# Patient Record
Sex: MALE | Race: WHITE | Employment: OTHER | URBAN - METROPOLITAN AREA
[De-identification: names, ages, dates, MRNs, and addresses within clinical notes are randomized per-mention and may not be internally consistent; named-entity substitution may affect disease eponyms.]

---

## 2017-02-21 PROBLEM — Z79.4 UNCONTROLLED TYPE 2 DIABETES MELLITUS WITH HYPERGLYCEMIA, WITH LONG-TERM CURRENT USE OF INSULIN (HCC): Status: ACTIVE | Noted: 2017-02-21

## 2017-02-21 PROBLEM — E11.65 UNCONTROLLED TYPE 2 DIABETES MELLITUS WITH HYPERGLYCEMIA, WITH LONG-TERM CURRENT USE OF INSULIN (HCC): Status: ACTIVE | Noted: 2017-02-21

## 2017-03-11 PROCEDURE — 82570 ASSAY OF URINE CREATININE: CPT | Performed by: INTERNAL MEDICINE

## 2017-03-11 PROCEDURE — 36415 COLL VENOUS BLD VENIPUNCTURE: CPT | Performed by: INTERNAL MEDICINE

## 2017-03-11 PROCEDURE — 82043 UR ALBUMIN QUANTITATIVE: CPT | Performed by: INTERNAL MEDICINE

## 2018-01-09 PROCEDURE — 83970 ASSAY OF PARATHORMONE: CPT | Performed by: INTERNAL MEDICINE

## 2018-01-09 PROCEDURE — 82570 ASSAY OF URINE CREATININE: CPT | Performed by: INTERNAL MEDICINE

## 2018-01-09 PROCEDURE — 84156 ASSAY OF PROTEIN URINE: CPT | Performed by: INTERNAL MEDICINE

## 2018-03-30 PROBLEM — E78.1 HYPERTRIGLYCERIDEMIA: Status: ACTIVE | Noted: 2018-03-30

## 2018-06-07 PROCEDURE — 87086 URINE CULTURE/COLONY COUNT: CPT | Performed by: INTERNAL MEDICINE

## 2018-06-08 PROCEDURE — 84153 ASSAY OF PSA TOTAL: CPT | Performed by: INTERNAL MEDICINE

## 2018-06-08 PROCEDURE — 83970 ASSAY OF PARATHORMONE: CPT | Performed by: INTERNAL MEDICINE

## 2018-06-13 PROBLEM — R31.0 GROSS HEMATURIA: Status: ACTIVE | Noted: 2018-06-13

## 2018-06-22 PROBLEM — N20.0 NEPHROLITHIASIS: Status: ACTIVE | Noted: 2018-06-22

## 2018-07-05 PROCEDURE — 82365 CALCULUS SPECTROSCOPY: CPT | Performed by: UROLOGY

## 2018-07-06 PROBLEM — R10.9 FLANK PAIN: Status: ACTIVE | Noted: 2018-07-06

## 2018-07-19 PROCEDURE — 88307 TISSUE EXAM BY PATHOLOGIST: CPT | Performed by: UROLOGY

## 2018-07-19 PROCEDURE — 87086 URINE CULTURE/COLONY COUNT: CPT | Performed by: UROLOGY

## 2018-08-22 PROBLEM — Z85.51 HISTORY OF BLADDER CANCER: Status: ACTIVE | Noted: 2018-08-22

## 2018-09-07 PROCEDURE — 82507 ASSAY OF CITRATE: CPT | Performed by: UROLOGY

## 2018-09-07 PROCEDURE — 83945 ASSAY OF OXALATE: CPT | Performed by: UROLOGY

## 2018-09-07 PROCEDURE — 82436 ASSAY OF URINE CHLORIDE: CPT | Performed by: UROLOGY

## 2018-09-07 PROCEDURE — 84392 ASSAY OF URINE SULFATE: CPT | Performed by: UROLOGY

## 2018-09-07 PROCEDURE — 82340 ASSAY OF CALCIUM IN URINE: CPT | Performed by: UROLOGY

## 2018-12-08 PROCEDURE — 82436 ASSAY OF URINE CHLORIDE: CPT | Performed by: UROLOGY

## 2018-12-08 PROCEDURE — 82340 ASSAY OF CALCIUM IN URINE: CPT | Performed by: UROLOGY

## 2018-12-08 PROCEDURE — 83945 ASSAY OF OXALATE: CPT | Performed by: UROLOGY

## 2018-12-08 PROCEDURE — 84392 ASSAY OF URINE SULFATE: CPT | Performed by: UROLOGY

## 2018-12-08 PROCEDURE — 86708 HEPATITIS A ANTIBODY: CPT | Performed by: INTERNAL MEDICINE

## 2018-12-08 PROCEDURE — 82610 CYSTATIN C: CPT | Performed by: INTERNAL MEDICINE

## 2018-12-08 PROCEDURE — 82507 ASSAY OF CITRATE: CPT | Performed by: UROLOGY

## 2019-01-09 ENCOUNTER — APPOINTMENT (OUTPATIENT)
Dept: CT IMAGING | Facility: HOSPITAL | Age: 64
End: 2019-01-09
Attending: EMERGENCY MEDICINE
Payer: COMMERCIAL

## 2019-01-09 ENCOUNTER — APPOINTMENT (OUTPATIENT)
Dept: GENERAL RADIOLOGY | Facility: HOSPITAL | Age: 64
End: 2019-01-09
Attending: EMERGENCY MEDICINE
Payer: COMMERCIAL

## 2019-01-09 ENCOUNTER — HOSPITAL ENCOUNTER (OUTPATIENT)
Facility: HOSPITAL | Age: 64
Setting detail: OBSERVATION
Discharge: HOME | End: 2019-01-10
Attending: EMERGENCY MEDICINE | Admitting: INTERNAL MEDICINE
Payer: COMMERCIAL

## 2019-01-09 DIAGNOSIS — R73.9 HYPERGLYCEMIA: ICD-10-CM

## 2019-01-09 DIAGNOSIS — N28.9 ACUTE RENAL INSUFFICIENCY: Primary | ICD-10-CM

## 2019-01-09 LAB
ALBUMIN SERPL-MCNC: 3.5 G/DL (ref 3.1–4.5)
ALBUMIN/GLOB SERPL: 0.9 {RATIO} (ref 1–2)
ALP LIVER SERPL-CCNC: 68 U/L (ref 45–117)
ALT SERPL-CCNC: 51 U/L (ref 17–63)
ANION GAP SERPL CALC-SCNC: 9 MMOL/L (ref 0–18)
AST SERPL-CCNC: 46 U/L (ref 15–41)
ATRIAL RATE: 89 BPM
BASOPHILS # BLD AUTO: 0.07 X10(3) UL (ref 0–0.1)
BASOPHILS NFR BLD AUTO: 0.9 %
BILIRUB SERPL-MCNC: 0.4 MG/DL (ref 0.1–2)
BILIRUB UR QL STRIP.AUTO: NEGATIVE
BUN BLD-MCNC: 25 MG/DL (ref 8–20)
BUN/CREAT SERPL: 14.6 (ref 10–20)
CALCIUM BLD-MCNC: 10.6 MG/DL (ref 8.3–10.3)
CHLORIDE SERPL-SCNC: 100 MMOL/L (ref 101–111)
CLARITY UR REFRACT.AUTO: CLEAR
CO2 SERPL-SCNC: 26 MMOL/L (ref 22–32)
COLOR UR AUTO: YELLOW
CREAT BLD-MCNC: 1.71 MG/DL (ref 0.7–1.3)
EOSINOPHIL # BLD AUTO: 0.51 X10(3) UL (ref 0–0.3)
EOSINOPHIL NFR BLD AUTO: 6.4 %
ERYTHROCYTE [DISTWIDTH] IN BLOOD BY AUTOMATED COUNT: 14 % (ref 11.5–16)
EST. AVERAGE GLUCOSE BLD GHB EST-MCNC: 240 MG/DL (ref 68–126)
GLOBULIN PLAS-MCNC: 3.9 G/DL (ref 2.8–4.4)
GLUCOSE BLD-MCNC: 113 MG/DL (ref 65–99)
GLUCOSE BLD-MCNC: 122 MG/DL (ref 65–99)
GLUCOSE BLD-MCNC: 175 MG/DL (ref 65–99)
GLUCOSE BLD-MCNC: 229 MG/DL (ref 65–99)
GLUCOSE BLD-MCNC: 346 MG/DL (ref 70–99)
GLUCOSE UR STRIP.AUTO-MCNC: >=500 MG/DL
HBA1C MFR BLD HPLC: 10 % (ref ?–5.7)
HCT VFR BLD AUTO: 40.9 % (ref 37–53)
HGB BLD-MCNC: 13.9 G/DL (ref 13–17)
IMMATURE GRANULOCYTE COUNT: 0.05 X10(3) UL (ref 0–1)
IMMATURE GRANULOCYTE RATIO %: 0.6 %
KETONES UR STRIP.AUTO-MCNC: NEGATIVE MG/DL
LEUKOCYTE ESTERASE UR QL STRIP.AUTO: NEGATIVE
LYMPHOCYTES # BLD AUTO: 1.21 X10(3) UL (ref 0.9–4)
LYMPHOCYTES NFR BLD AUTO: 15.1 %
M PROTEIN MFR SERPL ELPH: 7.4 G/DL (ref 6.4–8.2)
MCH RBC QN AUTO: 30 PG (ref 27–33.2)
MCHC RBC AUTO-ENTMCNC: 34 G/DL (ref 31–37)
MCV RBC AUTO: 88.3 FL (ref 80–99)
MONOCYTES # BLD AUTO: 0.65 X10(3) UL (ref 0.1–1)
MONOCYTES NFR BLD AUTO: 8.1 %
NEUTROPHIL ABS PRELIM: 5.53 X10 (3) UL (ref 1.3–6.7)
NEUTROPHILS # BLD AUTO: 5.53 X10(3) UL (ref 1.3–6.7)
NEUTROPHILS NFR BLD AUTO: 68.9 %
NITRITE UR QL STRIP.AUTO: NEGATIVE
OSMOLALITY SERPL CALC.SUM OF ELEC: 298 MOSM/KG (ref 275–295)
P AXIS: 38 DEGREES
P-R INTERVAL: 208 MS
PH UR STRIP.AUTO: 5 [PH] (ref 4.5–8)
PLATELET # BLD AUTO: 293 10(3)UL (ref 150–450)
POTASSIUM SERPL-SCNC: 3.8 MMOL/L (ref 3.6–5.1)
PROT UR STRIP.AUTO-MCNC: NEGATIVE MG/DL
Q-T INTERVAL: 380 MS
QRS DURATION: 144 MS
QTC CALCULATION (BEZET): 462 MS
R AXIS: 19 DEGREES
RBC # BLD AUTO: 4.63 X10(6)UL (ref 4.3–5.7)
RBC UR QL AUTO: NEGATIVE
RED CELL DISTRIBUTION WIDTH-SD: 44.7 FL (ref 35.1–46.3)
SODIUM SERPL-SCNC: 135 MMOL/L (ref 136–144)
SP GR UR STRIP.AUTO: 1.02 (ref 1–1.03)
T AXIS: 16 DEGREES
TROPONIN I SERPL-MCNC: <0.046 NG/ML (ref ?–0.05)
UROBILINOGEN UR STRIP.AUTO-MCNC: <2 MG/DL
VENTRICULAR RATE: 89 BPM
WBC # BLD AUTO: 8 X10(3) UL (ref 4–13)

## 2019-01-09 PROCEDURE — 71046 X-RAY EXAM CHEST 2 VIEWS: CPT | Performed by: EMERGENCY MEDICINE

## 2019-01-09 PROCEDURE — 82962 GLUCOSE BLOOD TEST: CPT

## 2019-01-09 PROCEDURE — 93005 ELECTROCARDIOGRAM TRACING: CPT

## 2019-01-09 PROCEDURE — 85025 COMPLETE CBC W/AUTO DIFF WBC: CPT | Performed by: EMERGENCY MEDICINE

## 2019-01-09 PROCEDURE — 99285 EMERGENCY DEPT VISIT HI MDM: CPT

## 2019-01-09 PROCEDURE — 96360 HYDRATION IV INFUSION INIT: CPT

## 2019-01-09 PROCEDURE — 96372 THER/PROPH/DIAG INJ SC/IM: CPT

## 2019-01-09 PROCEDURE — 81003 URINALYSIS AUTO W/O SCOPE: CPT | Performed by: EMERGENCY MEDICINE

## 2019-01-09 PROCEDURE — 80053 COMPREHEN METABOLIC PANEL: CPT | Performed by: EMERGENCY MEDICINE

## 2019-01-09 PROCEDURE — 96361 HYDRATE IV INFUSION ADD-ON: CPT

## 2019-01-09 PROCEDURE — 93010 ELECTROCARDIOGRAM REPORT: CPT

## 2019-01-09 PROCEDURE — 70450 CT HEAD/BRAIN W/O DYE: CPT | Performed by: EMERGENCY MEDICINE

## 2019-01-09 PROCEDURE — 94660 CPAP INITIATION&MGMT: CPT

## 2019-01-09 PROCEDURE — 83036 HEMOGLOBIN GLYCOSYLATED A1C: CPT | Performed by: INTERNAL MEDICINE

## 2019-01-09 PROCEDURE — 84484 ASSAY OF TROPONIN QUANT: CPT | Performed by: EMERGENCY MEDICINE

## 2019-01-09 RX ORDER — SODIUM PHOSPHATE, DIBASIC AND SODIUM PHOSPHATE, MONOBASIC 7; 19 G/133ML; G/133ML
1 ENEMA RECTAL ONCE AS NEEDED
Status: DISCONTINUED | OUTPATIENT
Start: 2019-01-09 | End: 2019-01-10

## 2019-01-09 RX ORDER — BISACODYL 10 MG
10 SUPPOSITORY, RECTAL RECTAL
Status: DISCONTINUED | OUTPATIENT
Start: 2019-01-09 | End: 2019-01-10

## 2019-01-09 RX ORDER — ALLOPURINOL 300 MG/1
300 TABLET ORAL DAILY
Status: DISCONTINUED | OUTPATIENT
Start: 2019-01-10 | End: 2019-01-10

## 2019-01-09 RX ORDER — INSULIN ASPART 100 [IU]/ML
0.1 INJECTION, SOLUTION INTRAVENOUS; SUBCUTANEOUS ONCE
Status: COMPLETED | OUTPATIENT
Start: 2019-01-09 | End: 2019-01-09

## 2019-01-09 RX ORDER — ASPIRIN 325 MG
325 TABLET ORAL DAILY
Status: DISCONTINUED | OUTPATIENT
Start: 2019-01-10 | End: 2019-01-10

## 2019-01-09 RX ORDER — ONDANSETRON 2 MG/ML
4 INJECTION INTRAMUSCULAR; INTRAVENOUS EVERY 6 HOURS PRN
Status: DISCONTINUED | OUTPATIENT
Start: 2019-01-09 | End: 2019-01-10

## 2019-01-09 RX ORDER — DOCUSATE SODIUM 100 MG/1
100 CAPSULE, LIQUID FILLED ORAL 2 TIMES DAILY
Status: DISCONTINUED | OUTPATIENT
Start: 2019-01-09 | End: 2019-01-10

## 2019-01-09 RX ORDER — METOCLOPRAMIDE HYDROCHLORIDE 5 MG/ML
10 INJECTION INTRAMUSCULAR; INTRAVENOUS EVERY 8 HOURS PRN
Status: DISCONTINUED | OUTPATIENT
Start: 2019-01-09 | End: 2019-01-10

## 2019-01-09 RX ORDER — ALLOPURINOL 300 MG/1
300 TABLET ORAL DAILY
COMMUNITY
End: 2019-01-14

## 2019-01-09 RX ORDER — ASPIRIN 325 MG
325 TABLET ORAL DAILY
COMMUNITY
End: 2019-12-03 | Stop reason: ALTCHOICE

## 2019-01-09 RX ORDER — SODIUM CHLORIDE 9 MG/ML
INJECTION, SOLUTION INTRAVENOUS CONTINUOUS
Status: DISCONTINUED | OUTPATIENT
Start: 2019-01-09 | End: 2019-01-10

## 2019-01-09 RX ORDER — SODIUM CHLORIDE 9 MG/ML
INJECTION, SOLUTION INTRAVENOUS CONTINUOUS
Status: ACTIVE | OUTPATIENT
Start: 2019-01-09 | End: 2019-01-09

## 2019-01-09 RX ORDER — ROSUVASTATIN CALCIUM 20 MG/1
40 TABLET, COATED ORAL NIGHTLY
Status: DISCONTINUED | OUTPATIENT
Start: 2019-01-09 | End: 2019-01-10

## 2019-01-09 RX ORDER — ENOXAPARIN SODIUM 100 MG/ML
0.5 INJECTION SUBCUTANEOUS NIGHTLY
Status: DISCONTINUED | OUTPATIENT
Start: 2019-01-09 | End: 2019-01-10

## 2019-01-09 RX ORDER — SODIUM CHLORIDE 9 MG/ML
INJECTION, SOLUTION INTRAVENOUS ONCE
Status: DISCONTINUED | OUTPATIENT
Start: 2019-01-09 | End: 2019-01-10

## 2019-01-09 RX ORDER — ENALAPRIL MALEATE AND HYDROCHLOROTHIAZIDE 10; 25 MG/1; MG/1
1 TABLET ORAL 2 TIMES DAILY
COMMUNITY
End: 2019-12-03

## 2019-01-09 RX ORDER — ACETAMINOPHEN 325 MG/1
650 TABLET ORAL EVERY 6 HOURS PRN
Status: DISCONTINUED | OUTPATIENT
Start: 2019-01-09 | End: 2019-01-10

## 2019-01-09 RX ORDER — POLYETHYLENE GLYCOL 3350 17 G/17G
17 POWDER, FOR SOLUTION ORAL DAILY PRN
Status: DISCONTINUED | OUTPATIENT
Start: 2019-01-09 | End: 2019-01-10

## 2019-01-09 RX ORDER — ROSUVASTATIN CALCIUM 40 MG/1
40 TABLET, COATED ORAL NIGHTLY
COMMUNITY
End: 2020-06-04

## 2019-01-09 RX ORDER — SODIUM CHLORIDE 9 MG/ML
125 INJECTION, SOLUTION INTRAVENOUS CONTINUOUS
Status: DISCONTINUED | OUTPATIENT
Start: 2019-01-09 | End: 2019-01-10

## 2019-01-09 RX ORDER — DEXTROSE MONOHYDRATE 25 G/50ML
50 INJECTION, SOLUTION INTRAVENOUS
Status: DISCONTINUED | OUTPATIENT
Start: 2019-01-09 | End: 2019-01-10

## 2019-01-09 RX ORDER — OMEGA-3-ACID ETHYL ESTERS 1 G/1
2 CAPSULE, LIQUID FILLED ORAL 2 TIMES DAILY
COMMUNITY
End: 2020-02-03 | Stop reason: ALTCHOICE

## 2019-01-09 NOTE — PLAN OF CARE
Patient received from ER via stretcher, alert and oriented x 4, lungs clear on room air, abdomen distended, bowel sounds present, passing flatus, denies N/V/D.  Patient denies pain and discomfort, IV site RAC, consult to respiratory-MARYSOL, RT aware, will requ

## 2019-01-09 NOTE — ED PROVIDER NOTES
Patient Seen in: [facility] Emergency Department    History   Patient presents with:  Altered Mental Status (neurologic)  Fatigue (constitutional, neurologic)    Stated Complaint: Patient reports increased confusion and balance issues in past few da polyp, tics; repeat in 10/2014 due to family history of colon cancer   • COLONOSCOPY, POSSIBLE BIOPSY, POSSIBLE POLYPECTOMY 00313 N/A 3/24/2015    Performed by Nova Valero MD at [address] [facility] 7/5/20 LITHOTRIPSY LASER WITH CYSTOSCOPY Left 7/5/2018    Performed by Tavon Kidd DO at [facility] 94    left parotid surgery   • OTHER SURGICAL HISTORY  07/19/2018    cysto, right retrogradye pyelogram, right Neck: Normal range of motion. Neck supple. Cardiovascular: Normal rate, regular rhythm and normal heart sounds. Pulmonary/Chest: Effort normal and breath sounds normal.   Abdominal: Soft.  Bowel sounds are normal.   Musculoskeletal: Normal range of mo DIFFERENTIAL[[id number]]          Abnormal            Final result                 Please view results for these tests on the individual orders.    RAINBOW DRAW BLUE   RAINBOW DRAW LAVENDER   RAINBOW DRAW LIGHT GREEN   RAINBOW DRAW GOLD     EKG    Rate, inte Clinical Impression:  Acute renal insufficiency  (primary encounter diagnosis)  Hyperglycemia    Disposition:  Admit  1/9/2019 12:40 pm    Follow-up:  No follow-up provider specified.       Medications Prescribed:  Current Discharge Medication List

## 2019-01-09 NOTE — PROGRESS NOTES
[facility] Progress Note:  Anticoagulation Weight Dose Adjustment for enoxaparin (LOVENOX)    Gricelda Silva is a 61year old male who has been prescribed enoxaparin (LOVENOX) 40 mg SC daily for VTE prophylaxis. Estimated Creatinine Clearance: 47. 1

## 2019-01-09 NOTE — H&P
[facility] Hospitalist History and Physical      Patient presents with:  Altered Mental Status (neurologic)  Fatigue (constitutional, neurologic)       PCP: Yara Arias MD      History of Present Illness: Patient is a 61year old male with PMH sig for DM2, OS SURGICAL [place], [facility]   • CYSTOSCOPY, STENT EXCHANGE N/A 7/19/2018    Performed by Lj Ivory DO at [address] [place] [address] Right 8/9/2018    Performed by Lj Ivory DO at [place] [address] [address]   • OTHER SURGICAL HISTORY  11/20/2018    Cystoscopy w/ Dr Michelle Ray   • TONSILLECTOMY  At age 3   • UPPER GI ENDOSCOPY,DIAGNOSIS  10/1/2009    gastritis, esophagitis        ALL:    No Known Allergies             No current outpatient medications Extremities normal, atraumatic, no cyanosis or edema. Skin: Skin color, texture, turgor normal. No rashes or lesions.     Neurologic: Normal strength, no focal deficit appreciated     Data Review:    LABS:   Lab Results   Component Value Date    WBC 8.0 0 ([address]) which includes the Dose Index Registry. FINDINGS:  Ventricles and sulci are within normal limits. Mild age indeterminate small vessel ischemic disease. There is no midline shift or mass-effect.   The basal cisterns are

## 2019-01-10 VITALS
WEIGHT: 298.19 LBS | HEIGHT: 71 IN | DIASTOLIC BLOOD PRESSURE: 76 MMHG | TEMPERATURE: 98 F | RESPIRATION RATE: 18 BRPM | BODY MASS INDEX: 41.75 KG/M2 | OXYGEN SATURATION: 95 % | HEART RATE: 83 BPM | SYSTOLIC BLOOD PRESSURE: 150 MMHG

## 2019-01-10 LAB
ANION GAP SERPL CALC-SCNC: 5 MMOL/L (ref 0–18)
BASOPHILS # BLD AUTO: 0.09 X10(3) UL (ref 0–0.1)
BASOPHILS NFR BLD AUTO: 1.1 %
BUN BLD-MCNC: 18 MG/DL (ref 8–20)
BUN/CREAT SERPL: 13.3 (ref 10–20)
CALCIUM BLD-MCNC: 9.9 MG/DL (ref 8.3–10.3)
CHLORIDE SERPL-SCNC: 105 MMOL/L (ref 101–111)
CO2 SERPL-SCNC: 30 MMOL/L (ref 22–32)
CREAT BLD-MCNC: 1.35 MG/DL (ref 0.7–1.3)
EOSINOPHIL # BLD AUTO: 0.51 X10(3) UL (ref 0–0.3)
EOSINOPHIL NFR BLD AUTO: 6 %
ERYTHROCYTE [DISTWIDTH] IN BLOOD BY AUTOMATED COUNT: 13.9 % (ref 11.5–16)
GLUCOSE BLD-MCNC: 146 MG/DL (ref 65–99)
GLUCOSE BLD-MCNC: 164 MG/DL (ref 70–99)
GLUCOSE BLD-MCNC: 337 MG/DL (ref 65–99)
GLUCOSE BLD-MCNC: 91 MG/DL (ref 65–99)
HCT VFR BLD AUTO: 37.8 % (ref 37–53)
HGB BLD-MCNC: 12.1 G/DL (ref 13–17)
IMMATURE GRANULOCYTE COUNT: 0.04 X10(3) UL (ref 0–1)
IMMATURE GRANULOCYTE RATIO %: 0.5 %
LYMPHOCYTES # BLD AUTO: 1.58 X10(3) UL (ref 0.9–4)
LYMPHOCYTES NFR BLD AUTO: 18.5 %
MCH RBC QN AUTO: 29.4 PG (ref 27–33.2)
MCHC RBC AUTO-ENTMCNC: 32 G/DL (ref 31–37)
MCV RBC AUTO: 92 FL (ref 80–99)
MONOCYTES # BLD AUTO: 0.65 X10(3) UL (ref 0.1–1)
MONOCYTES NFR BLD AUTO: 7.6 %
NEUTROPHIL ABS PRELIM: 5.67 X10 (3) UL (ref 1.3–6.7)
NEUTROPHILS # BLD AUTO: 5.67 X10(3) UL (ref 1.3–6.7)
NEUTROPHILS NFR BLD AUTO: 66.3 %
OSMOLALITY SERPL CALC.SUM OF ELEC: 296 MOSM/KG (ref 275–295)
PLATELET # BLD AUTO: 279 10(3)UL (ref 150–450)
POTASSIUM SERPL-SCNC: 3.6 MMOL/L (ref 3.6–5.1)
RBC # BLD AUTO: 4.11 X10(6)UL (ref 4.3–5.7)
RED CELL DISTRIBUTION WIDTH-SD: 46.8 FL (ref 35.1–46.3)
SODIUM SERPL-SCNC: 140 MMOL/L (ref 136–144)
WBC # BLD AUTO: 8.5 X10(3) UL (ref 4–13)

## 2019-01-10 PROCEDURE — 80048 BASIC METABOLIC PNL TOTAL CA: CPT | Performed by: INTERNAL MEDICINE

## 2019-01-10 PROCEDURE — 85025 COMPLETE CBC W/AUTO DIFF WBC: CPT | Performed by: INTERNAL MEDICINE

## 2019-01-10 PROCEDURE — 82962 GLUCOSE BLOOD TEST: CPT

## 2019-01-10 RX ORDER — POTASSIUM CHLORIDE 20 MEQ/1
40 TABLET, EXTENDED RELEASE ORAL EVERY 4 HOURS
Status: COMPLETED | OUTPATIENT
Start: 2019-01-10 | End: 2019-01-10

## 2019-01-10 RX ORDER — ENALAPRIL MALEATE 10 MG/1
10 TABLET ORAL DAILY
Status: DISCONTINUED | OUTPATIENT
Start: 2019-01-10 | End: 2019-01-10

## 2019-01-10 NOTE — PROGRESS NOTES
NURSING DISCHARGE NOTE    Discharged Home via Wheelchair. Accompanied by Spouse  Belongings Taken by patient/family. Pt AOx4. VSS. Blood sugars more under control. Discussed AVS  And follow ups with pt.  To follow up with PCP and endocrinologist in

## 2019-01-10 NOTE — RESPIRATORY THERAPY NOTE
MARYSOL Equipment Usage Summary :            Set Mode :AUTO CPAP W FLEX          Usage in Hours: 07;31          90% Pressure (EPAP) :  17          90% Insp Pressure (IPAP);           AHI : 18.1          Supplemental Oxygen :      LPM

## 2019-01-10 NOTE — PLAN OF CARE
Diabetes/Glucose Control    • Glucose maintained within prescribed range Progressing            Pt alert and oriented this shift with episode of incontinence. Pt requiring encouragement to participate in his care.  Blood sugar within normal limits at hs. Pt

## 2019-01-10 NOTE — DISCHARGE SUMMARY
General Medicine Discharge Summary     Patient ID:  Breana Montaño  61year old  11/7/1955    Admit date: 1/9/2019    Discharge date and time: 1/10/19    Attending Physician: DO Ag Cardenas Date: 1/9/2019  PROCEDURE:  XR CHEST PA + LAT CHEST (CPT=71046)  INDICATIONS:  Patient reports increased confusion and balance issues in past few days, No hx CVA  COMPARISON:  None. TECHNIQUE:  PA and lateral chest radiographs were obtained.   PATIENT STAT clinical concern for acute ischemia/infarction, an MRI of the brain would be recommended for further evaluation.     Dictated by: Mary Springer MD on 1/09/2019 at 11:30     Approved by: Mary Springer MD               Operative Procedures:        Azjacey Abbott

## 2019-01-10 NOTE — BH LEVEL OF CARE ASSESSMENT
Level of Care Assessment Note    General Questions  Why are you here?: The pt states he came into the hospital due to weakness and couldnt walk. Precipitating Events: Referral placed for possible depression.   History of Present Illness: Patient states las No  Do you have a firearm owner ID card?: No    Self Injury  History of Self Injurious Behaviors: No  Present Self-Injurious Behaviors: No    Mental Health Symptoms  Hallucination Type: No problems reported or observed  Delusions: No problems reported or o No    Compulsive Behaviors  Are you/others concerned about any of the following behaviors over the past 30 days?: Denies                                              Functional Impairment  Currently Attending School: No  Employment Status: Employed  Job Is said, since being told this he is having some anxiety and depression. The pt states this doesnt make him feel good and now he has to figure out what to do.   The pt said, he is not suicidal.      Risk/Protective Factors  Risk Factors: Stressful life events

## 2019-02-22 PROCEDURE — 36415 COLL VENOUS BLD VENIPUNCTURE: CPT | Performed by: INTERNAL MEDICINE

## 2019-02-22 PROCEDURE — 82570 ASSAY OF URINE CREATININE: CPT | Performed by: INTERNAL MEDICINE

## 2019-02-22 PROCEDURE — 84156 ASSAY OF PROTEIN URINE: CPT | Performed by: INTERNAL MEDICINE

## 2019-02-22 PROCEDURE — 82043 UR ALBUMIN QUANTITATIVE: CPT | Performed by: INTERNAL MEDICINE

## 2019-05-11 PROCEDURE — 82436 ASSAY OF URINE CHLORIDE: CPT | Performed by: UROLOGY

## 2019-05-11 PROCEDURE — 82507 ASSAY OF CITRATE: CPT | Performed by: UROLOGY

## 2019-05-11 PROCEDURE — 83945 ASSAY OF OXALATE: CPT | Performed by: UROLOGY

## 2019-05-11 PROCEDURE — 84392 ASSAY OF URINE SULFATE: CPT | Performed by: UROLOGY

## 2019-05-11 PROCEDURE — 82340 ASSAY OF CALCIUM IN URINE: CPT | Performed by: UROLOGY

## 2019-06-01 PROCEDURE — 83945 ASSAY OF OXALATE: CPT | Performed by: UROLOGY

## 2019-06-01 PROCEDURE — 82340 ASSAY OF CALCIUM IN URINE: CPT | Performed by: UROLOGY

## 2019-06-01 PROCEDURE — 82436 ASSAY OF URINE CHLORIDE: CPT | Performed by: UROLOGY

## 2019-06-01 PROCEDURE — 82507 ASSAY OF CITRATE: CPT | Performed by: UROLOGY

## 2019-06-01 PROCEDURE — 84392 ASSAY OF URINE SULFATE: CPT | Performed by: UROLOGY

## 2020-03-16 ENCOUNTER — APPOINTMENT (OUTPATIENT)
Dept: CT IMAGING | Facility: HOSPITAL | Age: 65
DRG: 065 | End: 2020-03-16
Payer: COMMERCIAL

## 2020-03-16 ENCOUNTER — HOSPITAL ENCOUNTER (INPATIENT)
Facility: HOSPITAL | Age: 65
LOS: 2 days | Discharge: HOME | DRG: 065 | End: 2020-03-18
Attending: EMERGENCY MEDICINE | Admitting: INTERNAL MEDICINE
Payer: COMMERCIAL

## 2020-03-16 DIAGNOSIS — I63.9 ACUTE CVA (CEREBROVASCULAR ACCIDENT) (HCC): Primary | ICD-10-CM

## 2020-03-16 DIAGNOSIS — R47.01 EXPRESSIVE APHASIA: ICD-10-CM

## 2020-03-16 LAB
ALBUMIN SERPL-MCNC: 3.6 G/DL (ref 3.4–5)
ALBUMIN/GLOB SERPL: 0.9 {RATIO} (ref 1–2)
ALP LIVER SERPL-CCNC: 57 U/L (ref 45–117)
ALT SERPL-CCNC: 55 U/L (ref 16–61)
ANION GAP SERPL CALC-SCNC: 7 MMOL/L (ref 0–18)
APTT PPP: 31 SECONDS (ref 25.4–36.1)
AST SERPL-CCNC: 47 U/L (ref 15–37)
BASOPHILS # BLD AUTO: 0.09 X10(3) UL (ref 0–0.2)
BASOPHILS NFR BLD AUTO: 0.9 %
BILIRUB SERPL-MCNC: 0.4 MG/DL (ref 0.1–2)
BUN BLD-MCNC: 21 MG/DL (ref 7–18)
BUN/CREAT SERPL: 13.2 (ref 10–20)
CALCIUM BLD-MCNC: 10.8 MG/DL (ref 8.5–10.1)
CHLORIDE SERPL-SCNC: 102 MMOL/L (ref 98–112)
CHOLEST SMN-MCNC: 149 MG/DL (ref ?–200)
CO2 SERPL-SCNC: 26 MMOL/L (ref 21–32)
CREAT BLD-MCNC: 1.59 MG/DL (ref 0.7–1.3)
DEPRECATED RDW RBC AUTO: 44.3 FL (ref 35.1–46.3)
EOSINOPHIL # BLD AUTO: 0.47 X10(3) UL (ref 0–0.7)
EOSINOPHIL NFR BLD AUTO: 4.8 %
ERYTHROCYTE [DISTWIDTH] IN BLOOD BY AUTOMATED COUNT: 13.5 % (ref 11–15)
GLOBULIN PLAS-MCNC: 4 G/DL (ref 2.8–4.4)
GLUCOSE BLD-MCNC: 176 MG/DL (ref 70–99)
GLUCOSE BLD-MCNC: 186 MG/DL (ref 70–99)
GLUCOSE BLD-MCNC: 248 MG/DL (ref 70–99)
HAV IGM SER QL: 1.5 MG/DL (ref 1.6–2.6)
HCT VFR BLD AUTO: 43.3 % (ref 39–53)
HDLC SERPL-MCNC: 32 MG/DL (ref 40–59)
HGB BLD-MCNC: 14.6 G/DL (ref 13–17.5)
IMM GRANULOCYTES # BLD AUTO: 0.05 X10(3) UL (ref 0–1)
IMM GRANULOCYTES NFR BLD: 0.5 %
INR BLD: 0.97 (ref 0.9–1.1)
LDLC SERPL CALC-MCNC: 44 MG/DL (ref ?–100)
LYMPHOCYTES # BLD AUTO: 2.11 X10(3) UL (ref 1–4)
LYMPHOCYTES NFR BLD AUTO: 21.6 %
M PROTEIN MFR SERPL ELPH: 7.6 G/DL (ref 6.4–8.2)
MCH RBC QN AUTO: 30.7 PG (ref 26–34)
MCHC RBC AUTO-ENTMCNC: 33.7 G/DL (ref 31–37)
MCV RBC AUTO: 91 FL (ref 80–100)
MONOCYTES # BLD AUTO: 0.84 X10(3) UL (ref 0.1–1)
MONOCYTES NFR BLD AUTO: 8.6 %
NEUTROPHILS # BLD AUTO: 6.21 X10 (3) UL (ref 1.5–7.7)
NEUTROPHILS # BLD AUTO: 6.21 X10(3) UL (ref 1.5–7.7)
NEUTROPHILS NFR BLD AUTO: 63.6 %
NONHDLC SERPL-MCNC: 117 MG/DL (ref ?–130)
OSMOLALITY SERPL CALC.SUM OF ELEC: 288 MOSM/KG (ref 275–295)
PLATELET # BLD AUTO: 306 10(3)UL (ref 150–450)
POTASSIUM SERPL-SCNC: 3.9 MMOL/L (ref 3.5–5.1)
PSA SERPL DL<=0.01 NG/ML-MCNC: 13.3 SECONDS (ref 12.5–14.7)
RBC # BLD AUTO: 4.76 X10(6)UL (ref 4.3–5.7)
SODIUM SERPL-SCNC: 135 MMOL/L (ref 136–145)
TRIGL SERPL-MCNC: 366 MG/DL (ref 30–149)
TROPONIN I SERPL-MCNC: <0.045 NG/ML (ref ?–0.04)
VLDLC SERPL CALC-MCNC: 73 MG/DL (ref 0–30)
WBC # BLD AUTO: 9.8 X10(3) UL (ref 4–11)

## 2020-03-16 PROCEDURE — 70498 CT ANGIOGRAPHY NECK: CPT | Performed by: EMERGENCY MEDICINE

## 2020-03-16 PROCEDURE — 70450 CT HEAD/BRAIN W/O DYE: CPT

## 2020-03-16 PROCEDURE — 70496 CT ANGIOGRAPHY HEAD: CPT | Performed by: EMERGENCY MEDICINE

## 2020-03-16 PROCEDURE — 5A09357 ASSISTANCE WITH RESPIRATORY VENTILATION, LESS THAN 24 CONSECUTIVE HOURS, CONTINUOUS POSITIVE AIRWAY PRESSURE: ICD-10-PCS | Performed by: INTERNAL MEDICINE

## 2020-03-16 RX ORDER — FAMOTIDINE 10 MG/ML
20 INJECTION, SOLUTION INTRAVENOUS DAILY
Status: DISCONTINUED | OUTPATIENT
Start: 2020-03-16 | End: 2020-03-18

## 2020-03-16 RX ORDER — ATORVASTATIN CALCIUM 80 MG/1
80 TABLET, FILM COATED ORAL NIGHTLY
Status: DISCONTINUED | OUTPATIENT
Start: 2020-03-16 | End: 2020-03-18

## 2020-03-16 RX ORDER — PHENYLEPHRINE HCL IN 0.9% NACL 50MG/250ML
PLASTIC BAG, INJECTION (ML) INTRAVENOUS CONTINUOUS PRN
Status: DISCONTINUED | OUTPATIENT
Start: 2020-03-16 | End: 2020-03-18

## 2020-03-16 RX ORDER — ASPIRIN 325 MG
325 TABLET ORAL ONCE
Status: DISCONTINUED | OUTPATIENT
Start: 2020-03-16 | End: 2020-03-16

## 2020-03-16 RX ORDER — POLYETHYLENE GLYCOL 3350 17 G/17G
17 POWDER, FOR SOLUTION ORAL DAILY PRN
Status: DISCONTINUED | OUTPATIENT
Start: 2020-03-16 | End: 2020-03-18

## 2020-03-16 RX ORDER — ACETAMINOPHEN 650 MG/1
650 SUPPOSITORY RECTAL EVERY 4 HOURS PRN
Status: DISCONTINUED | OUTPATIENT
Start: 2020-03-16 | End: 2020-03-18

## 2020-03-16 RX ORDER — ACETAMINOPHEN 325 MG/1
650 TABLET ORAL EVERY 4 HOURS PRN
Status: DISCONTINUED | OUTPATIENT
Start: 2020-03-16 | End: 2020-03-18

## 2020-03-16 RX ORDER — ONDANSETRON 2 MG/ML
4 INJECTION INTRAMUSCULAR; INTRAVENOUS EVERY 6 HOURS PRN
Status: DISCONTINUED | OUTPATIENT
Start: 2020-03-16 | End: 2020-03-18

## 2020-03-16 RX ORDER — DOCUSATE SODIUM 100 MG/1
100 CAPSULE, LIQUID FILLED ORAL 2 TIMES DAILY
Status: DISCONTINUED | OUTPATIENT
Start: 2020-03-16 | End: 2020-03-18

## 2020-03-16 RX ORDER — DEXTROSE MONOHYDRATE 25 G/50ML
50 INJECTION, SOLUTION INTRAVENOUS
Status: DISCONTINUED | OUTPATIENT
Start: 2020-03-16 | End: 2020-03-18

## 2020-03-16 RX ORDER — ASPIRIN 81 MG/1
324 TABLET, CHEWABLE ORAL ONCE
Status: COMPLETED | OUTPATIENT
Start: 2020-03-16 | End: 2020-03-16

## 2020-03-16 RX ORDER — ASPIRIN 325 MG
325 TABLET ORAL DAILY
Status: DISCONTINUED | OUTPATIENT
Start: 2020-03-17 | End: 2020-03-18

## 2020-03-16 RX ORDER — FAMOTIDINE 20 MG/1
20 TABLET ORAL DAILY
Status: DISCONTINUED | OUTPATIENT
Start: 2020-03-16 | End: 2020-03-18

## 2020-03-16 RX ORDER — SENNOSIDES 8.6 MG
17.2 TABLET ORAL NIGHTLY
Status: DISCONTINUED | OUTPATIENT
Start: 2020-03-16 | End: 2020-03-18

## 2020-03-16 RX ORDER — SODIUM CHLORIDE 9 MG/ML
INJECTION, SOLUTION INTRAVENOUS CONTINUOUS
Status: DISCONTINUED | OUTPATIENT
Start: 2020-03-16 | End: 2020-03-18

## 2020-03-16 RX ORDER — BISACODYL 10 MG
10 SUPPOSITORY, RECTAL RECTAL
Status: DISCONTINUED | OUTPATIENT
Start: 2020-03-16 | End: 2020-03-18

## 2020-03-16 RX ORDER — METOCLOPRAMIDE HYDROCHLORIDE 5 MG/ML
10 INJECTION INTRAMUSCULAR; INTRAVENOUS EVERY 8 HOURS PRN
Status: DISCONTINUED | OUTPATIENT
Start: 2020-03-16 | End: 2020-03-18

## 2020-03-16 RX ORDER — ASPIRIN 300 MG
300 SUPPOSITORY, RECTAL RECTAL DAILY
Status: DISCONTINUED | OUTPATIENT
Start: 2020-03-17 | End: 2020-03-18

## 2020-03-16 RX ORDER — SODIUM PHOSPHATE, DIBASIC AND SODIUM PHOSPHATE, MONOBASIC 7; 19 G/133ML; G/133ML
1 ENEMA RECTAL ONCE AS NEEDED
Status: DISCONTINUED | OUTPATIENT
Start: 2020-03-16 | End: 2020-03-18

## 2020-03-16 RX ORDER — LABETALOL HYDROCHLORIDE 5 MG/ML
10 INJECTION, SOLUTION INTRAVENOUS EVERY 10 MIN PRN
Status: DISCONTINUED | OUTPATIENT
Start: 2020-03-16 | End: 2020-03-18

## 2020-03-16 NOTE — H&P
[facility] hospitalist H+P  PCP Ralf Nguyen MD  Cc  aphasia  HPI 60 yo male with multiple medical problems including but not limited to CVA, Chhaya, morbid obesity BMI 42.5 in Epic, DM2, brought in due to expressive aphasia.  Last normal reportedly 12:29 pm  Curr CYSTOSCOPY, STENT EXCHANGE N/A 7/19/2018    Performed by Vincent Lao DO at [facility] 110 Right 8/9/2018    Performed by Vincent Lao DO at [facility]   [facility]  11/20/2018    Cystoscopy w/ Dr Avelino Hughes   • OTHER SURGICAL HISTORY  02/27/2019    Cystoscopy w/ Dr Judy Bernal   • OTHER SURGICAL HISTORY  05/29/2019    Cystoscopy w./ Dr Avelino Hughes   • OTHER SURGICAL HISTORY  08/28/2019    Cystsocopy w/ Dr Jason Esteban Active member of club or organization: Not on file        Attends meetings of clubs or organizations: Not on file        Relationship status: Not on file      Intimate partner violence:        Fear of current or ex partner: Not on file        Emotionally Take 40 mg by mouth nightly., Disp: , Rfl:   Misc Natural Products (OSTEO BI-FLEX ADV TRIPLE ST OR), Take 1 tablet by mouth daily. , Disp: , Rfl:   Bioflavonoid Products (CHAPARRITA C OR), Take 1 tablet daily (has 1000 mg of vit C), Disp: , Rfl:       ROS 10 sys

## 2020-03-16 NOTE — ED NOTES
ER RN at bedside.  Patient gives verbal consent at this time to give all medical information and updates to patients wife, Eyal Reeves ([phone] call first; [phone]- home secondary number.)

## 2020-03-16 NOTE — CODE DOCUMENTATION
Called to C9/C9 at [phone] for Code Stroke. Arrived to dept at 1643. Met patient in hallway en route to CT. Upon arrival found patient with NIH of 3: slight right facial droop; expressive aphagia; unable to answer one question.  .  Last Known Normal was 1229

## 2020-03-16 NOTE — ED NOTES
Report given to Enedelia Jensen, [address]. Patient and family updated on plan of care, all questions at this time. Patients wife stated \"I will bring his cpap machine in and drop it off at the front to be given to him. \" Call light within reach.  Will continue to m

## 2020-03-17 ENCOUNTER — APPOINTMENT (OUTPATIENT)
Dept: MRI IMAGING | Facility: HOSPITAL | Age: 65
DRG: 065 | End: 2020-03-17
Attending: Other
Payer: COMMERCIAL

## 2020-03-17 ENCOUNTER — APPOINTMENT (OUTPATIENT)
Dept: CV DIAGNOSTICS | Facility: HOSPITAL | Age: 65
DRG: 065 | End: 2020-03-17
Attending: HOSPITALIST
Payer: COMMERCIAL

## 2020-03-17 LAB
ANION GAP SERPL CALC-SCNC: 7 MMOL/L (ref 0–18)
ATRIAL RATE: 92 BPM
BUN BLD-MCNC: 23 MG/DL (ref 7–18)
BUN/CREAT SERPL: 16.7 (ref 10–20)
CALCIUM BLD-MCNC: 10.1 MG/DL (ref 8.5–10.1)
CHLORIDE SERPL-SCNC: 105 MMOL/L (ref 98–112)
CO2 SERPL-SCNC: 26 MMOL/L (ref 21–32)
CREAT BLD-MCNC: 1.38 MG/DL (ref 0.7–1.3)
GLUCOSE BLD-MCNC: 131 MG/DL (ref 70–99)
GLUCOSE BLD-MCNC: 187 MG/DL (ref 70–99)
GLUCOSE BLD-MCNC: 198 MG/DL (ref 70–99)
GLUCOSE BLD-MCNC: 198 MG/DL (ref 70–99)
GLUCOSE BLD-MCNC: 240 MG/DL (ref 70–99)
HAV IGM SER QL: 1.5 MG/DL (ref 1.6–2.6)
OSMOLALITY SERPL CALC.SUM OF ELEC: 295 MOSM/KG (ref 275–295)
P AXIS: 50 DEGREES
P-R INTERVAL: 210 MS
POTASSIUM SERPL-SCNC: 3.8 MMOL/L (ref 3.5–5.1)
Q-T INTERVAL: 378 MS
QRS DURATION: 118 MS
QTC CALCULATION (BEZET): 467 MS
R AXIS: 142 DEGREES
SODIUM SERPL-SCNC: 138 MMOL/L (ref 136–145)
T AXIS: 26 DEGREES
VENTRICULAR RATE: 92 BPM

## 2020-03-17 PROCEDURE — 93306 TTE W/DOPPLER COMPLETE: CPT | Performed by: HOSPITALIST

## 2020-03-17 PROCEDURE — 99253 IP/OBS CNSLTJ NEW/EST LOW 45: CPT | Performed by: OTHER

## 2020-03-17 PROCEDURE — 70551 MRI BRAIN STEM W/O DYE: CPT | Performed by: OTHER

## 2020-03-17 RX ORDER — POTASSIUM CHLORIDE 20 MEQ/1
40 TABLET, EXTENDED RELEASE ORAL ONCE
Status: COMPLETED | OUTPATIENT
Start: 2020-03-17 | End: 2020-03-17

## 2020-03-17 RX ORDER — MAGNESIUM OXIDE 400 MG (241.3 MG MAGNESIUM) TABLET
800 TABLET ONCE
Status: COMPLETED | OUTPATIENT
Start: 2020-03-17 | End: 2020-03-17

## 2020-03-17 NOTE — CM/SW NOTE
Pt is a 60 yo male admitted for CVA. Pt is on the CVA protocol. Pt lives with his wife. Pt has no deficits. No d/c needs anticipated.   / to remain available for support and/or discharge planning.      03/17/20 1600   CM/SW Scre

## 2020-03-17 NOTE — PROGRESS NOTES
[facility] hospitalist daily note  Patient was seen/examined on 3/17/20    S; no chest pain, no SOB, no abd pain, no nausea/emesis  No numbness/tingling  No new vision changes  Per pt speech is getting better    Medications in Epic    PE    03/17/20  1224   BP: 1 neurology     Hyponatremia; resolved     FRANKLYN improved with IVF   Follow up with PCP     MARYSOL per protocol     Morbid obesity BMI 42.5 in Epic lifestyle change and follow up with PCP     DM2 insulin ordered (as outpatient doses per pt's request)     Preventa

## 2020-03-17 NOTE — SLP NOTE
Order received for bedside swallow evaluation per CVA protocol however pt currently occupied with testing. Will re-attempt as available and appropriate.     Britni Kothari MA, [id number] [facility]  Pager

## 2020-03-17 NOTE — OCCUPATIONAL THERAPY NOTE
Per stroke protocol and stroke committee recommendation, patient is on bedrest for 24 hrs from ADT time of 16:34 on 3/16. OT will evaluate the patient once activity level is upgraded.

## 2020-03-17 NOTE — CONSULTS
[facility]  Report of Consultation    Ionelinda Montaño Patient Status:  Inpatient    1955 MRN [id number]   [facility] 7NE-A Attending Steve Aguilar MD   Hosp Day # 1 PCP Mckenzie Lopez MD     Reason for Consultation:  Expressive bladder incontinence or mood issues.        History:  Past Medical History:   Diagnosis Date   • Acute CVA (cerebrovascular accident) ([facility] 3/16/2020   • Arrhythmia     Bundle branch block   • BUNDLE BRANCH BLOCK NOS 7/9/2008   • Cancer ([facility] 03/01/2020 7/19/2018    Performed by Jordy Blas DO at [address] [place] [address]   • [address] Left 7/5/2018    Performed by Jordy Blas DO at [address] [place] [address]   • CYSTOSCOPY/URETEROSCOPY/STONE EXTRACTION Right 03/04/2020    Cystoscopy w/ Dr Tillman    • TONSILLECTOMY  At age 3   • UPPER GI ENDOSCOPY,DIAGNOSIS  10/1/2009    gastritis, esophagitis     Family History   Problem Relation Age of Onset   • Heart Disorder Father         CAD with MI - passed away at age LANCETS Does not apply Misc, CHECK BLOOD SUGARS 3 TIMES PER DAY, Disp: 300 each, Rfl: 1, Taking  Insulin Pen Needle (NOVOFINE) 32G X 6 MM Does not apply Misc, USE 4 TIMES PER DAY, Disp: 400 each, Rfl: 1, Taking  Rosuvastatin Calcium 40 MG Oral Tab, Take 40 injection 10 mg, 10 mg, Intravenous, Q8H PRN  •  famoTIDine (PEPCID) tab 20 mg, 20 mg, Oral, Daily **OR** famoTIDine (PEPCID) injection 20 mg, 20 mg, Intravenous, Daily  •  insulin detemir (LEVEMIR) 100 UNIT/ML flextouch 130 Units, 130 Units, Subcutaneous, well nourished, no acute distress  HEENT: normocephalic  Heart; normal K3/Q4, regular rate and rhythm  Lungs: clear to auscultation bilaterally  Extremities: no edema, peripheral pulses intact  Abd: soft, nt/nd; +bs    Neck: supple, full range of motion; n occlusion. 2. No evidence of occlusion, dissection, or flow-limiting stenosis in the cervical vertebral or carotid arteries. No evidence of hemodynamically significant carotid stenosis by NASCET criteria. Assessment:  1.  Principal Problem:  2.   Acu

## 2020-03-17 NOTE — ED PROVIDER NOTES
Patient Seen in: [facility] Emergency Department 7ne-a      History   Patient presents with:  Stroke    Stated Complaint: expressive aphagia x30 min     HPI    Patient is a 66-year-old male comes in emergency room for evaluation of expressive aphasia.   Patient history i polyps, diverticulosis, hemorrhoids   • COLONOSCOPY  10/1/2009    polyp, tics; repeat in 10/2014 due to family history of colon cancer   • COLONOSCOPY, POSSIBLE BIOPSY, POSSIBLE POLYPECTOMY 85592 N/A 3/24/2015    Performed by Hudson Del Real MD at [address] [place] [address] Lyn Márquez DO at [facility]   • HOLMIUM  LITHOTRIPSY LASER WITH CYSTOSCOPY Left 7/5/2018    Performed by Lyn Márquez DO at [facility] 94    left parotid surgery   • OTHER SURGICAL H Device 03/16/20 1632 None (Room air)       Current:/61 (BP Location: Right arm)   Pulse 97   Temp 98.3 °F (36.8 °C) (Oral)   Resp 15   Wt (!) 138.4 kg   SpO2 99%   BMI 42.56 kg/m²         Physical Exam    GENERAL: No acute distress, Well appearing an GFR, -American 52 (*)     AST 47 (*)     A/G Ratio 0.9 (*)     All other components within normal limits   MAGNESIUM - Abnormal; Notable for the following components:    Magnesium 1.5 (*)     All other components within normal limits   LIPID PANE information is transmitted to the ACR ([facility]) NRDR ([address]) which includes the Dose Index Registry. PATIENT STATED HISTORY: (As transcribed by Technologist)  Patient has aphasia and right facial droop.    Cheryle Levins Registry) which includes the Dose Index Registry. PATIENT STATED HISTORY:(As transcribed by Technologist)  Patient has aphasia and right facial droop.    CONTRAST USED:  75cc of Omnipaque 350  FINDINGS:  Please see the separately dictated noncontrast CT of evidence of occlusion, dissection, or flow-limiting stenosis in the cervical vertebral or carotid arteries. No evidence of hemodynamically significant carotid stenosis by NASCET criteria.   Critical results were discussed with Dr. Chet Crandall at 1701 hours on

## 2020-03-17 NOTE — PLAN OF CARE
Assumed pt care at 0730  VSS- pt denies pain  Neuros Q4- expressive aphasia at times, trouble finding words  Plan for MRI tomorrow, screening completed  POC discussed with pt  Will continue to monitor

## 2020-03-17 NOTE — RESPIRATORY THERAPY NOTE
MARYSOL - Equipment Use Daily Summary:                  . Set Mode: CPAP                . Usage in hours: 10:30                . 90% Pressure (EPAP) level: 10                . 90% Insp. Pressure (IPAP): Yuvonne Manifold AHI: 5.9                .  Supplemental Ox

## 2020-03-17 NOTE — PLAN OF CARE
Patient alert and oriented times four. Expressive aphasia. Meds given per MAR. Bedrest. IV fluids. Neuros per stroke protocol. Denies any pain or discomfort. Resting comfortably in bed. Call light in reach.

## 2020-03-17 NOTE — PHYSICAL THERAPY NOTE
Per stroke protocol and stroke committee recommendation, patient is on bedrest for 24 hrs from ADT time of 1634 on 3/16/20. PT will evaluate the patient once activity level is upgraded.

## 2020-03-17 NOTE — SLP NOTE
ADULT SWALLOWING EVALUATION    ASSESSMENT    ASSESSMENT/OVERALL IMPRESSION:  Order received for bedside swallow evaluation per CVA protocol. Pt is a 58 y/o male who presented with difficulty speaking.  Pt passed dysphagia screening and is currently on a reg HISTORY  Reason for Referral: Stroke protocol    Problem List  Principal Problem:    Acute CVA (cerebrovascular accident) [facility])  Active Problems:    Expressive aphasia      Past Medical History  Past Medical History:   Diagnosis Date   • Acute CVA (cerebro be WFL - no overt clinical s/s of aspiration )           (Please note: Silent aspiration cannot be evaluated clinically.  Videofluoroscopic Swallow Study is required to rule-out silent aspiration.)    Esophageal Phase of Swallow: No complaints consistent wi

## 2020-03-18 VITALS
RESPIRATION RATE: 19 BRPM | HEART RATE: 76 BPM | TEMPERATURE: 99 F | OXYGEN SATURATION: 98 % | BODY MASS INDEX: 43 KG/M2 | DIASTOLIC BLOOD PRESSURE: 58 MMHG | SYSTOLIC BLOOD PRESSURE: 147 MMHG | WEIGHT: 305.13 LBS

## 2020-03-18 LAB
GLUCOSE BLD-MCNC: 192 MG/DL (ref 70–99)
GLUCOSE BLD-MCNC: 224 MG/DL (ref 70–99)
HAV IGM SER QL: 1.7 MG/DL (ref 1.6–2.6)
POTASSIUM SERPL-SCNC: 3.9 MMOL/L (ref 3.5–5.1)

## 2020-03-18 PROCEDURE — 99233 SBSQ HOSP IP/OBS HIGH 50: CPT | Performed by: OTHER

## 2020-03-18 RX ORDER — ASPIRIN 325 MG
325 TABLET ORAL DAILY
Qty: 30 TABLET | Refills: 0 | Status: ON HOLD | OUTPATIENT
Start: 2020-03-19 | End: 2021-08-11

## 2020-03-18 RX ORDER — MAGNESIUM OXIDE 400 MG (241.3 MG MAGNESIUM) TABLET
400 TABLET ONCE
Status: COMPLETED | OUTPATIENT
Start: 2020-03-18 | End: 2020-03-18

## 2020-03-18 NOTE — OCCUPATIONAL THERAPY NOTE
OCCUPATIONAL THERAPY QUICK EVALUATION - INPATIENT    Room Number: 6811/1762-B  Evaluation Date: 3/18/2020     Type of Evaluation: Quick Eval  Presenting Problem: expressive aphasia    Physician Order: IP Consult to Occupational Therapy  Reason for Therapy: SURGICAL CENTER, [facility]   • CYSTO, WITH INSERTION OF STENT Left 7/5/2018    Performed by Trenton Kohli DO at [address] TUMOR N/A 7/19/2018    Performed by Trenton Kohli DO at [facility] HISTORY  07/19/2018    cysto, right retrogradye pyelogram, right ureteroscopy, transuretheral resection bladder tumor-Dr. Shakeel Ayala   • OTHER SURGICAL HISTORY  08/09/2018    Cystoscopy, right retrograde pyelogram, right ureteroscopy with laser lithotripsy Putting on and taking off regular lower body clothing?: None   -   Bathing (including washing, rinsing, drying)?: None  -   Toileting, which includes using toilet, bedpan or urinal? : None  -   Putting on and taking off regular upper body clothing?: None Profile/Medical History  LOW - Brief history including review of medical or therapy records    Specific performance deficits impacting engagement in ADL/IADL  LOW  1 - 3 performance deficits    Client Assessment/Performance Deficits  LOW - No comorbidities

## 2020-03-18 NOTE — PHYSICAL THERAPY NOTE
PHYSICAL THERAPY QUICK EVALUATION - INPATIENT    Room Number: 3626/9465-Z  Evaluation Date: 3/18/2020  Presenting Problem: expressive aphasia, r/o CVA  Physician Order: PT Eval and Treat    Problem List  Principal Problem:    Acute CVA (cerebrovascular a [place], [facility]   • CYSTOSCOPY, STENT EXCHANGE N/A 7/19/2018    Performed by Michaela Hall DO at [address] [place] [address] Right 8/9/2018    Performed by Michaela Hall DO at [address] [place] [address] • OTHER SURGICAL HISTORY  11/20/2018    Cystoscopy w/ Dr Sully Schuler   • OTHER SURGICAL HISTORY  02/27/2019    Cystoscopy w/ Dr Aniket Fernandez   • OTHER SURGICAL HISTORY  05/29/2019    Cystoscopy w./ Dr Sully Schuler   • OTHER SURGICAL HISTORY  08/28/2019    Cystso ABILITY STATUS  Gait Assessment  Gait Assistance: Modified independent  Distance (ft): 200  Assistive Device: None  Pattern: R Decreased stance time;L Decreased stance time          Skilled Therapy Provided: Pt performed all functional mobility Mod I.  Pt d

## 2020-03-18 NOTE — PLAN OF CARE
Patient is alert and oriented. Denies pain or nausea. Patient continues to report some word finding difficulty. MRI completed. IV fluids infusing. Ambulates with stand by assist. Plan to return home at discharge.       Problem: NEUROLOGICAL - ADULT  Goal: A

## 2020-03-18 NOTE — SLP NOTE
SPEECH/LANGUAGE EVALUATION - INPATIENT    Admission Date: 3/16/2020  Evaluation Date: 03/18/20    Reason for Referral: Stroke protocol    ASSESSMENT & PLAN   ASSESSMENT & IMPRESSION  Patient seen for a speech language evaluation per stroke protocol.  The We

## 2020-03-18 NOTE — PROGRESS NOTES
[address] [facility] Progress Note    Nilo Barreto Patient Status:  Inpatient    1955 MRN [id number]   [facility] 7NE-A Attending Chantale Malonet, [address] [facility] Day # 2 PCP Nedra Zavala MD         Subjective:  Maggy Velazquez focal arterial occlusion. 2. No evidence of occlusion, dissection, or flow-limiting stenosis in the cervical vertebral or carotid arteries. No evidence of hemodynamically significant carotid stenosis by NASCET criteria.     Labs:   Lipids: LDl 44, TGs 366, distress  HEENT: normocephalic  Eyes: sclera anicteric  Heart; normal S1/S2, regular rate and rhythm  Lungs: clear to auscultation bilaterally    Neuro:  Mental status:  Orientation: Alert and oriented to person, place, time  Speech fluent and conversation Trileaflet; mildly calcified leaflets. Thickening,     consistent with sclerosis. 5. Atrial septum: Echo bubble study showed no shunt.     Impressions:  No previous study was available for comparison.     Labs:   BMP:     Lab Results   Component Value Date 12/08/2018     Lab Results   Component Value Date    ALT 55 03/16/2020    ALT 51 01/09/2019    ALT 38 12/08/2018          Assessment/Plan:    This is a 59year old man with complicated PMHx including but not limited to HTN, HL, DM type 2 and morbid obesity,

## 2020-03-18 NOTE — RESPIRATORY THERAPY NOTE
MARYSOL - Equipment Use Daily Summary:                  . Set Mode: CPAP                . Usage in hours: 9:30                . 90% Pressure (EPAP) level: 10                . 90% Insp. Pressure (IPAP): [place] AHI: 12.3                .  Supplemental Ox

## 2020-03-18 NOTE — PLAN OF CARE
NURSING DISCHARGE NOTE    Discharged Home via Wheelchair. Accompanied by Support staff  Belongings Taken by patient/family.     Patient educated on d/c medications, follow ups, and instructions  Patient verbalized understanding  All questions answered

## 2020-03-18 NOTE — DISCHARGE SUMMARY
General Medicine Discharge Summary     Patient ID:  Marlene Richards  59year old  11/7/1955    Admit date: 3/16/2020    Discharge date and time: 3/18/20    Attending Physician: Jemima Cade DO change and follow up with PCP     DM2 insulin ordered (as outpatient doses per pt's request)     Consults: IP CONSULT TO GENERAL SURGERY  IP CONSULT TO HOSPITALIST  IP CONSULT TO SOCIAL WORK  IP CONSULT TO PHYSICAL THERAPY  IP CONSULT TO OCCUPATIONAL THERA 03/18/20  1300   BP:    Pulse: 78   Resp:    Temp:        General: no acute distress, alert and oriented x 3  Heart: RRR  Lungs: clear bilaterally, no active wheezing  Abdomen: nontender, nondistended, intact BS  Extremities: no pedal edema   Neuro: CN shelli

## 2020-03-30 PROBLEM — N18.30 TYPE 2 DM WITH CKD STAGE 3 AND HYPERTENSION (HCC): Status: ACTIVE | Noted: 2020-03-30

## 2020-03-30 PROBLEM — E78.2 DM TYPE 2 WITH DIABETIC MIXED HYPERLIPIDEMIA: Status: ACTIVE | Noted: 2020-03-30

## 2020-03-30 PROBLEM — I12.9 TYPE 2 DM WITH CKD STAGE 3 AND HYPERTENSION (HCC): Status: ACTIVE | Noted: 2020-03-30

## 2020-03-30 PROBLEM — R10.9 FLANK PAIN: Status: RESOLVED | Noted: 2018-07-06 | Resolved: 2020-03-30

## 2020-03-30 PROBLEM — R73.9 HYPERGLYCEMIA: Status: RESOLVED | Noted: 2019-01-09 | Resolved: 2020-03-30

## 2020-03-30 PROBLEM — E11.69 DM TYPE 2 WITH DIABETIC MIXED HYPERLIPIDEMIA (HCC): Status: ACTIVE | Noted: 2020-03-30

## 2020-03-30 PROBLEM — E11.22 TYPE 2 DM WITH CKD STAGE 3 AND HYPERTENSION (HCC): Status: ACTIVE | Noted: 2020-03-30

## 2020-03-30 PROBLEM — E78.1 HYPERTRIGLYCERIDEMIA: Status: RESOLVED | Noted: 2018-03-30 | Resolved: 2020-03-30

## 2020-03-30 PROBLEM — E11.69 DM TYPE 2 WITH DIABETIC MIXED HYPERLIPIDEMIA: Status: ACTIVE | Noted: 2020-03-30

## 2020-03-30 PROBLEM — E78.2 DM TYPE 2 WITH DIABETIC MIXED HYPERLIPIDEMIA (HCC): Status: ACTIVE | Noted: 2020-03-30

## 2020-06-09 PROBLEM — R82.994 HYPERCALCIURIA: Status: ACTIVE | Noted: 2020-06-09

## 2020-06-09 PROBLEM — E11.29 MICROALBUMINURIA DUE TO TYPE 2 DIABETES MELLITUS (HCC): Status: ACTIVE | Noted: 2020-06-09

## 2020-06-09 PROBLEM — R80.9 MICROALBUMINURIA DUE TO TYPE 2 DIABETES MELLITUS (HCC): Status: ACTIVE | Noted: 2020-06-09

## 2020-06-09 PROBLEM — R80.9 MICROALBUMINURIA DUE TO TYPE 2 DIABETES MELLITUS: Status: ACTIVE | Noted: 2020-06-09

## 2020-06-09 PROBLEM — E11.29 MICROALBUMINURIA DUE TO TYPE 2 DIABETES MELLITUS: Status: ACTIVE | Noted: 2020-06-09

## 2020-06-09 PROBLEM — E83.52 HYPERCALCEMIA: Status: ACTIVE | Noted: 2020-06-09

## 2020-07-23 ENCOUNTER — NURSE ONLY (OUTPATIENT)
Dept: ELECTROPHYSIOLOGY | Facility: HOSPITAL | Age: 65
End: 2020-07-23
Attending: Other
Payer: COMMERCIAL

## 2020-07-24 NOTE — PROCEDURES
Clinical History: 59year old male presenting after an episode of aphasia    EEG DESCRIPTION    AWAKE  Background: 9-10Hz; 30-70 uV; posterior head regions, symmetric, waxing and waning, reactive to eye opening and closure  Beta: 15-25 Hz; 20 uV; frontocen

## 2020-08-13 PROBLEM — R31.0 GROSS HEMATURIA: Status: RESOLVED | Noted: 2018-06-13 | Resolved: 2020-08-13

## 2020-08-13 PROBLEM — E78.1 HYPERTRIGLYCERIDEMIA: Status: RESOLVED | Noted: 2018-03-30 | Resolved: 2020-08-13

## 2020-09-10 PROCEDURE — 88305 TISSUE EXAM BY PATHOLOGIST: CPT | Performed by: INTERNAL MEDICINE

## 2020-10-13 PROBLEM — R82.994 HYPERCALCIURIA: Status: RESOLVED | Noted: 2020-06-09 | Resolved: 2020-10-13

## 2020-10-13 PROBLEM — I12.9 TYPE 2 DM WITH CKD STAGE 3 AND HYPERTENSION (HCC): Status: RESOLVED | Noted: 2020-03-30 | Resolved: 2020-10-13

## 2020-10-13 PROBLEM — N18.30 TYPE 2 DM WITH CKD STAGE 3 AND HYPERTENSION (HCC): Status: RESOLVED | Noted: 2020-03-30 | Resolved: 2020-10-13

## 2020-10-13 PROBLEM — E11.22 TYPE 2 DM WITH CKD STAGE 3 AND HYPERTENSION (HCC): Status: RESOLVED | Noted: 2020-03-30 | Resolved: 2020-10-13

## 2021-08-04 ENCOUNTER — HOSPITAL ENCOUNTER (INPATIENT)
Facility: HOSPITAL | Age: 66
LOS: 7 days | Discharge: HOME HEALTH | DRG: 234 | End: 2021-08-11
Attending: EMERGENCY MEDICINE | Admitting: INTERNAL MEDICINE
Payer: MEDICARE

## 2021-08-04 ENCOUNTER — APPOINTMENT (OUTPATIENT)
Dept: GENERAL RADIOLOGY | Facility: HOSPITAL | Age: 66
DRG: 234 | End: 2021-08-04
Attending: EMERGENCY MEDICINE
Payer: MEDICARE

## 2021-08-04 DIAGNOSIS — E11.65 UNCONTROLLED TYPE 2 DIABETES MELLITUS WITH STAGE 3 CHRONIC KIDNEY DISEASE, WITH LONG-TERM CURRENT USE OF INSULIN (HCC): ICD-10-CM

## 2021-08-04 DIAGNOSIS — E11.22 UNCONTROLLED TYPE 2 DIABETES MELLITUS WITH STAGE 3 CHRONIC KIDNEY DISEASE, WITH LONG-TERM CURRENT USE OF INSULIN (HCC): ICD-10-CM

## 2021-08-04 DIAGNOSIS — Z79.4 UNCONTROLLED TYPE 2 DIABETES MELLITUS WITH STAGE 3 CHRONIC KIDNEY DISEASE, WITH LONG-TERM CURRENT USE OF INSULIN (HCC): ICD-10-CM

## 2021-08-04 DIAGNOSIS — J90 PLEURAL EFFUSION: ICD-10-CM

## 2021-08-04 DIAGNOSIS — E11.69 DM TYPE 2 WITH DIABETIC MIXED HYPERLIPIDEMIA (HCC): ICD-10-CM

## 2021-08-04 DIAGNOSIS — E78.2 MIXED HYPERLIPIDEMIA: ICD-10-CM

## 2021-08-04 DIAGNOSIS — E78.5 HYPERLIPIDEMIA LDL GOAL <100: ICD-10-CM

## 2021-08-04 DIAGNOSIS — Z00.00 ANNUAL PHYSICAL EXAM: ICD-10-CM

## 2021-08-04 DIAGNOSIS — I21.4 NSTEMI (NON-ST ELEVATED MYOCARDIAL INFARCTION) (HCC): Primary | ICD-10-CM

## 2021-08-04 DIAGNOSIS — E78.2 DM TYPE 2 WITH DIABETIC MIXED HYPERLIPIDEMIA (HCC): ICD-10-CM

## 2021-08-04 DIAGNOSIS — Z00.00 ROUTINE GENERAL MEDICAL EXAMINATION AT A HEALTH CARE FACILITY: ICD-10-CM

## 2021-08-04 DIAGNOSIS — N18.30 UNCONTROLLED TYPE 2 DIABETES MELLITUS WITH STAGE 3 CHRONIC KIDNEY DISEASE, WITH LONG-TERM CURRENT USE OF INSULIN (HCC): ICD-10-CM

## 2021-08-04 LAB
ALBUMIN SERPL-MCNC: 3.4 G/DL (ref 3.4–5)
ALBUMIN/GLOB SERPL: 0.9 {RATIO} (ref 1–2)
ALP LIVER SERPL-CCNC: 71 U/L
ALT SERPL-CCNC: 52 U/L
ANION GAP SERPL CALC-SCNC: 7 MMOL/L (ref 0–18)
APTT PPP: 33.1 SECONDS (ref 25.4–36.1)
AST SERPL-CCNC: 52 U/L (ref 15–37)
ATRIAL RATE: 105 BPM
ATRIAL RATE: 111 BPM
BASOPHILS # BLD AUTO: 0.08 X10(3) UL (ref 0–0.2)
BASOPHILS NFR BLD AUTO: 0.9 %
BILIRUB SERPL-MCNC: 0.3 MG/DL (ref 0.1–2)
BUN BLD-MCNC: 19 MG/DL (ref 7–18)
CALCIUM BLD-MCNC: 10.1 MG/DL (ref 8.5–10.1)
CHLORIDE SERPL-SCNC: 106 MMOL/L (ref 98–112)
CO2 SERPL-SCNC: 23 MMOL/L (ref 21–32)
CREAT BLD-MCNC: 1.46 MG/DL
D-DIMER: 0.43 UG/ML FEU (ref ?–0.65)
EOSINOPHIL # BLD AUTO: 0.38 X10(3) UL (ref 0–0.7)
EOSINOPHIL NFR BLD AUTO: 4.5 %
ERYTHROCYTE [DISTWIDTH] IN BLOOD BY AUTOMATED COUNT: 14.5 %
EST. AVERAGE GLUCOSE BLD GHB EST-MCNC: 189 MG/DL (ref 68–126)
GLOBULIN PLAS-MCNC: 3.8 G/DL (ref 2.8–4.4)
GLUCOSE BLD-MCNC: 191 MG/DL (ref 70–99)
GLUCOSE BLD-MCNC: 259 MG/DL (ref 70–99)
HBA1C MFR BLD HPLC: 8.2 % (ref ?–5.7)
HCT VFR BLD AUTO: 43.1 %
HGB BLD-MCNC: 14 G/DL
IMM GRANULOCYTES # BLD AUTO: 0.05 X10(3) UL (ref 0–1)
IMM GRANULOCYTES NFR BLD: 0.6 %
INR BLD: 0.99 (ref 0.89–1.11)
LYMPHOCYTES # BLD AUTO: 0.97 X10(3) UL (ref 1–4)
LYMPHOCYTES NFR BLD AUTO: 11.4 %
M PROTEIN MFR SERPL ELPH: 7.2 G/DL (ref 6.4–8.2)
MCH RBC QN AUTO: 29.4 PG (ref 26–34)
MCHC RBC AUTO-ENTMCNC: 32.5 G/DL (ref 31–37)
MCV RBC AUTO: 90.5 FL
MONOCYTES # BLD AUTO: 0.78 X10(3) UL (ref 0.1–1)
MONOCYTES NFR BLD AUTO: 9.2 %
NEUTROPHILS # BLD AUTO: 6.25 X10 (3) UL (ref 1.5–7.7)
NEUTROPHILS # BLD AUTO: 6.25 X10(3) UL (ref 1.5–7.7)
NEUTROPHILS NFR BLD AUTO: 73.4 %
NT-PROBNP SERPL-MCNC: 517 PG/ML (ref ?–125)
OSMOLALITY SERPL CALC.SUM OF ELEC: 293 MOSM/KG (ref 275–295)
P AXIS: 48 DEGREES
P AXIS: 70 DEGREES
P-R INTERVAL: 196 MS
P-R INTERVAL: 204 MS
PLATELET # BLD AUTO: 302 10(3)UL (ref 150–450)
POTASSIUM SERPL-SCNC: 4.5 MMOL/L (ref 3.5–5.1)
PSA SERPL DL<=0.01 NG/ML-MCNC: 13.3 SECONDS (ref 12.2–14.5)
Q-T INTERVAL: 362 MS
Q-T INTERVAL: 370 MS
QRS DURATION: 136 MS
QRS DURATION: 142 MS
QTC CALCULATION (BEZET): 478 MS
QTC CALCULATION (BEZET): 503 MS
R AXIS: 118 DEGREES
R AXIS: 121 DEGREES
RBC # BLD AUTO: 4.76 X10(6)UL
SODIUM SERPL-SCNC: 136 MMOL/L (ref 136–145)
T AXIS: -11 DEGREES
T AXIS: -22 DEGREES
TROPONIN I SERPL-MCNC: 1.34 NG/ML (ref ?–0.04)
TROPONIN I SERPL-MCNC: 9.75 NG/ML (ref ?–0.04)
VENTRICULAR RATE: 105 BPM
VENTRICULAR RATE: 111 BPM
WBC # BLD AUTO: 8.5 X10(3) UL (ref 4–11)

## 2021-08-04 PROCEDURE — 85610 PROTHROMBIN TIME: CPT | Performed by: EMERGENCY MEDICINE

## 2021-08-04 PROCEDURE — 85730 THROMBOPLASTIN TIME PARTIAL: CPT | Performed by: EMERGENCY MEDICINE

## 2021-08-04 PROCEDURE — 99291 CRITICAL CARE FIRST HOUR: CPT

## 2021-08-04 PROCEDURE — 93005 ELECTROCARDIOGRAM TRACING: CPT

## 2021-08-04 PROCEDURE — 85379 FIBRIN DEGRADATION QUANT: CPT | Performed by: EMERGENCY MEDICINE

## 2021-08-04 PROCEDURE — 84484 ASSAY OF TROPONIN QUANT: CPT | Performed by: INTERNAL MEDICINE

## 2021-08-04 PROCEDURE — 82962 GLUCOSE BLOOD TEST: CPT

## 2021-08-04 PROCEDURE — 71045 X-RAY EXAM CHEST 1 VIEW: CPT | Performed by: EMERGENCY MEDICINE

## 2021-08-04 PROCEDURE — 83036 HEMOGLOBIN GLYCOSYLATED A1C: CPT | Performed by: HOSPITALIST

## 2021-08-04 PROCEDURE — 93010 ELECTROCARDIOGRAM REPORT: CPT

## 2021-08-04 PROCEDURE — 94660 CPAP INITIATION&MGMT: CPT

## 2021-08-04 PROCEDURE — 84484 ASSAY OF TROPONIN QUANT: CPT | Performed by: EMERGENCY MEDICINE

## 2021-08-04 PROCEDURE — 83880 ASSAY OF NATRIURETIC PEPTIDE: CPT | Performed by: EMERGENCY MEDICINE

## 2021-08-04 PROCEDURE — 99285 EMERGENCY DEPT VISIT HI MDM: CPT

## 2021-08-04 PROCEDURE — 85025 COMPLETE CBC W/AUTO DIFF WBC: CPT | Performed by: EMERGENCY MEDICINE

## 2021-08-04 PROCEDURE — 96375 TX/PRO/DX INJ NEW DRUG ADDON: CPT

## 2021-08-04 PROCEDURE — 80053 COMPREHEN METABOLIC PANEL: CPT | Performed by: EMERGENCY MEDICINE

## 2021-08-04 PROCEDURE — 96365 THER/PROPH/DIAG IV INF INIT: CPT

## 2021-08-04 RX ORDER — BISACODYL 10 MG
10 SUPPOSITORY, RECTAL RECTAL
Status: DISCONTINUED | OUTPATIENT
Start: 2021-08-04 | End: 2021-08-05

## 2021-08-04 RX ORDER — METOCLOPRAMIDE HYDROCHLORIDE 5 MG/ML
10 INJECTION INTRAMUSCULAR; INTRAVENOUS EVERY 8 HOURS PRN
Status: DISCONTINUED | OUTPATIENT
Start: 2021-08-04 | End: 2021-08-05

## 2021-08-04 RX ORDER — SODIUM PHOSPHATE, DIBASIC AND SODIUM PHOSPHATE, MONOBASIC 7; 19 G/133ML; G/133ML
1 ENEMA RECTAL ONCE AS NEEDED
Status: DISCONTINUED | OUTPATIENT
Start: 2021-08-04 | End: 2021-08-05

## 2021-08-04 RX ORDER — INSULIN ASPART 100 [IU]/ML
90 INJECTION, SOLUTION INTRAVENOUS; SUBCUTANEOUS 2 TIMES DAILY
Status: ON HOLD | COMMUNITY
End: 2021-08-11

## 2021-08-04 RX ORDER — ALLOPURINOL 300 MG/1
300 TABLET ORAL DAILY
COMMUNITY
End: 2021-08-25

## 2021-08-04 RX ORDER — INSULIN DEGLUDEC 200 U/ML
150 INJECTION, SOLUTION SUBCUTANEOUS NIGHTLY
Status: ON HOLD | COMMUNITY
End: 2021-08-11

## 2021-08-04 RX ORDER — LIRAGLUTIDE 6 MG/ML
18 INJECTION SUBCUTANEOUS DAILY
Status: ON HOLD | COMMUNITY
End: 2021-08-11

## 2021-08-04 RX ORDER — ROSUVASTATIN CALCIUM 20 MG/1
40 TABLET, COATED ORAL NIGHTLY
Status: DISCONTINUED | OUTPATIENT
Start: 2021-08-04 | End: 2021-08-11

## 2021-08-04 RX ORDER — NITROGLYCERIN 0.4 MG/1
0.4 TABLET SUBLINGUAL EVERY 5 MIN PRN
Status: DISCONTINUED | OUTPATIENT
Start: 2021-08-04 | End: 2021-08-05

## 2021-08-04 RX ORDER — HEPARIN SODIUM AND DEXTROSE 10000; 5 [USP'U]/100ML; G/100ML
INJECTION INTRAVENOUS CONTINUOUS
Status: DISCONTINUED | OUTPATIENT
Start: 2021-08-04 | End: 2021-08-05

## 2021-08-04 RX ORDER — ONDANSETRON 2 MG/ML
4 INJECTION INTRAMUSCULAR; INTRAVENOUS EVERY 6 HOURS PRN
Status: DISCONTINUED | OUTPATIENT
Start: 2021-08-04 | End: 2021-08-05

## 2021-08-04 RX ORDER — ONDANSETRON 2 MG/ML
4 INJECTION INTRAMUSCULAR; INTRAVENOUS EVERY 4 HOURS PRN
Status: DISCONTINUED | OUTPATIENT
Start: 2021-08-04 | End: 2021-08-04 | Stop reason: HOSPADM

## 2021-08-04 RX ORDER — NITROGLYCERIN 20 MG/100ML
10 INJECTION INTRAVENOUS CONTINUOUS
Status: DISCONTINUED | OUTPATIENT
Start: 2021-08-04 | End: 2021-08-05

## 2021-08-04 RX ORDER — HEPARIN SODIUM 5000 [USP'U]/ML
5000 INJECTION INTRAVENOUS; SUBCUTANEOUS ONCE
Status: COMPLETED | OUTPATIENT
Start: 2021-08-04 | End: 2021-08-04

## 2021-08-04 RX ORDER — SODIUM CHLORIDE 9 MG/ML
INJECTION, SOLUTION INTRAVENOUS CONTINUOUS
Status: DISCONTINUED | OUTPATIENT
Start: 2021-08-04 | End: 2021-08-05

## 2021-08-04 RX ORDER — MORPHINE SULFATE 4 MG/ML
4 INJECTION, SOLUTION INTRAMUSCULAR; INTRAVENOUS ONCE
Status: COMPLETED | OUTPATIENT
Start: 2021-08-04 | End: 2021-08-04

## 2021-08-04 RX ORDER — DOCUSATE SODIUM 100 MG/1
100 CAPSULE, LIQUID FILLED ORAL 2 TIMES DAILY
Status: DISCONTINUED | OUTPATIENT
Start: 2021-08-04 | End: 2021-08-05

## 2021-08-04 RX ORDER — POLYETHYLENE GLYCOL 3350 17 G/17G
17 POWDER, FOR SOLUTION ORAL DAILY PRN
Status: DISCONTINUED | OUTPATIENT
Start: 2021-08-04 | End: 2021-08-05

## 2021-08-04 RX ORDER — DEXTROSE MONOHYDRATE 25 G/50ML
50 INJECTION, SOLUTION INTRAVENOUS
Status: DISCONTINUED | OUTPATIENT
Start: 2021-08-04 | End: 2021-08-05

## 2021-08-04 RX ORDER — ENALAPRIL MALEATE 20 MG/1
30 TABLET ORAL DAILY
Status: ON HOLD | COMMUNITY
End: 2021-08-11

## 2021-08-04 RX ORDER — ACETAMINOPHEN 325 MG/1
650 TABLET ORAL EVERY 6 HOURS PRN
Status: DISCONTINUED | OUTPATIENT
Start: 2021-08-04 | End: 2021-08-05

## 2021-08-04 RX ORDER — OMEPRAZOLE 40 MG/1
40 CAPSULE, DELAYED RELEASE ORAL DAILY
COMMUNITY
End: 2021-08-30 | Stop reason: ALTCHOICE

## 2021-08-04 RX ORDER — HEPARIN SODIUM AND DEXTROSE 10000; 5 [USP'U]/100ML; G/100ML
1000 INJECTION INTRAVENOUS ONCE
Status: COMPLETED | OUTPATIENT
Start: 2021-08-04 | End: 2021-08-05

## 2021-08-04 RX ORDER — ASPIRIN 81 MG/1
324 TABLET, CHEWABLE ORAL ONCE
Status: DISCONTINUED | OUTPATIENT
Start: 2021-08-04 | End: 2021-08-05

## 2021-08-04 NOTE — ED QUICK NOTES
Report received from [facility] at this time. Patient reports chest pain now decreased to 2/10 and left arm pain resolved, continues to have some right arm pain. MD informed of this.

## 2021-08-04 NOTE — ED QUICK NOTES
Orders for admission, patient is aware of plan and ready to go upstairs. Any questions, please call ED RN Haley at extension [phone]. Vaccinated?  YES  Type of COVID test sent: N/A  COVID Suspicion level: Low      Titratable drug(s) infusing: Heparin 10

## 2021-08-04 NOTE — ED PROVIDER NOTES
Patient Seen in: [facility] Emergency Department      History   Patient presents with:  Chest Pain Angina    Stated Complaint: Chest Pain     HPI/Subjective:   HPI    71-year-old male with past medical history of CVA, diabetes, hypertension presents • COLONOSCOPY,BIOPSY N/A 3/24/2015    Procedure: COLONOSCOPY, POSSIBLE BIOPSY, POSSIBLE POLYPECTOMY 97350;  Surgeon: Joshua Rubalcava MD;  Location: [address] [place] [address]   • EGD N/A 9/10/2020    Procedure: ESOPHAGOGASTRODUODENOSCOPY, COLONOSCOPY, POSS Systems    Positive for stated complaint: Chest Pain   Other systems are as noted in HPI. Constitutional and vital signs reviewed. All other systems reviewed and negative except as noted above.     Physical Exam     ED Triage Vitals   BP 08/04/21 1417 58 (*)     AST 52 (*)     A/G Ratio 0.9 (*)     All other components within normal limits   TROPONIN I - Abnormal; Notable for the following components:    Troponin 1.340 (*)     All other components within normal limits   PRO BETA NATRIURETIC PEPTIDE - Ab 105  Rhythm: Sinus Rhythm  Reading: Tachycardia, right bundle branch block, similar to previous EKG, no STEMI                    XR ABDOMEN (1 VIEW) (CPT=74018)    Result Date: 8/4/2021  DATE OF SERVICE: 08.03.2021 KUB, 8/3/2021.  COMPARISON STUDIES: KUDANNY, 2 unchanged from previous. He currently is hemodynamically stable, well-appearing and in no acute distress. Will give nitro for possible anginal chest pain. Plan for troponin to assess for cardiac ischemia.   Will obtain chest x-ray to assess for consolida List                          Hospital Problems     Present on Admission  Date Reviewed: 5/4/2021        ICD-10-CM Noted POA    * (Principal) NSTEMI (non-ST elevated myocardial infarction) ([facility] [address] I21.4 8/4/2021 Unknown

## 2021-08-04 NOTE — ED INITIAL ASSESSMENT (HPI)
Patient c/o chest pain for the last couple of hours radiating to his right shoulder and intermittently to his left arm. States feels short of breath.

## 2021-08-04 NOTE — CONSULTS
[facility]  Consultation Note      Donna Wakefield Patient Status:  Emergency    1955 MRN [id number]   Location [address] [facility] Day # 0 PCP Michela Parker MD     R Arrhythmia     Bundle branch block   • BUNDLE BRANCH BLOCK NOS 7/9/2008   • Cancer ([facility] [address] [facility] 03/01/2020    bladder CA   • Diabetes ([facility]    • Gout    • GOUT NOS 7/23/2007   • HNP (herniated nucleus pulposus) 1/3/2011   • Hypercholesterolemia    • Lumbar radicu OTHER SURGICAL HISTORY  11/20/2018    Cystoscopy w/ Dr Hargrove Public   • OTHER SURGICAL HISTORY  02/27/2019    Cystoscopy w/ Dr Mathew Recio   • OTHER SURGICAL HISTORY  05/29/2019    Cystoscopy w./ Dr Hargrove Public   • OTHER SURGICAL HISTORY  08/28/2019    Cystsocopy Min:97.8 °F (36.6 °C), Max:97.8 °F (36.6 °C)    Wt Readings from Last 3 Encounters:  08/04/21 : 300 lb (136.1 kg)  05/04/21 : (!) 312 lb (141.5 kg)  05/03/21 : (!) 313 lb (142 kg)      General: Awake and alert; in no acute distress  HEENT: Extraocular move

## 2021-08-05 ENCOUNTER — ANESTHESIA (OUTPATIENT)
Dept: CARDIAC SURGERY | Facility: HOSPITAL | Age: 66
DRG: 234 | End: 2021-08-05
Payer: MEDICARE

## 2021-08-05 ENCOUNTER — APPOINTMENT (OUTPATIENT)
Dept: GENERAL RADIOLOGY | Facility: HOSPITAL | Age: 66
DRG: 234 | End: 2021-08-05
Attending: PHYSICIAN ASSISTANT
Payer: MEDICARE

## 2021-08-05 ENCOUNTER — APPOINTMENT (OUTPATIENT)
Dept: CV DIAGNOSTICS | Facility: HOSPITAL | Age: 66
DRG: 234 | End: 2021-08-05
Attending: INTERNAL MEDICINE
Payer: MEDICARE

## 2021-08-05 ENCOUNTER — ANESTHESIA EVENT (OUTPATIENT)
Dept: CARDIAC SURGERY | Facility: HOSPITAL | Age: 66
DRG: 234 | End: 2021-08-05
Payer: MEDICARE

## 2021-08-05 ENCOUNTER — APPOINTMENT (OUTPATIENT)
Dept: INTERVENTIONAL RADIOLOGY/VASCULAR | Facility: HOSPITAL | Age: 66
DRG: 234 | End: 2021-08-05
Attending: STUDENT IN AN ORGANIZED HEALTH CARE EDUCATION/TRAINING PROGRAM
Payer: MEDICARE

## 2021-08-05 LAB
ALBUMIN SERPL-MCNC: 3 G/DL (ref 3.4–5)
ALBUMIN/GLOB SERPL: 0.9 {RATIO} (ref 1–2)
ALP LIVER SERPL-CCNC: 58 U/L
ALT SERPL-CCNC: 45 U/L
ANION GAP SERPL CALC-SCNC: 9 MMOL/L (ref 0–18)
ANTIBODY SCREEN: NEGATIVE
APTT PPP: 33.4 SECONDS (ref 25.4–36.1)
APTT PPP: 35.6 SECONDS (ref 25.4–36.1)
APTT PPP: 39.5 SECONDS (ref 25.4–36.1)
APTT PPP: 44.3 SECONDS (ref 25.4–36.1)
ARTERIAL BLD GAS O2 SATURATION: 95 % (ref 92–100)
ARTERIAL BLD GAS O2 SATURATION: 96 % (ref 92–100)
ARTERIAL BLOOD GAS BASE EXCESS: -3 MMOL/L (ref ?–2)
ARTERIAL BLOOD GAS BASE EXCESS: -3.4 MMOL/L (ref ?–2)
ARTERIAL BLOOD GAS HCO3: 20.7 MEQ/L (ref 22–26)
ARTERIAL BLOOD GAS HCO3: 21.6 MEQ/L (ref 22–26)
ARTERIAL BLOOD GAS PCO2: 34 MM HG (ref 35–45)
ARTERIAL BLOOD GAS PCO2: 36 MM HG (ref 35–45)
ARTERIAL BLOOD GAS PH: 7.4 (ref 7.35–7.45)
ARTERIAL BLOOD GAS PH: 7.4 (ref 7.35–7.45)
ARTERIAL BLOOD GAS PO2: 105 MM HG (ref 80–105)
ARTERIAL BLOOD GAS PO2: 117 MM HG (ref 80–105)
AST SERPL-CCNC: 98 U/L (ref 15–37)
ATRIAL RATE: 100 BPM
ATRIAL RATE: 94 BPM
BILIRUB SERPL-MCNC: 0.5 MG/DL (ref 0.1–2)
BUN BLD-MCNC: 19 MG/DL (ref 7–18)
BUN BLD-MCNC: 20 MG/DL (ref 7–18)
CALCIUM BLD-MCNC: 8.3 MG/DL (ref 8.5–10.1)
CALCIUM BLD-MCNC: 9.6 MG/DL (ref 8.5–10.1)
CALCULATED O2 SATURATION: 98 % (ref 92–100)
CALCULATED O2 SATURATION: 99 % (ref 92–100)
CARBOXYHEMOGLOBIN: 1.3 % SAT (ref 0–3)
CARBOXYHEMOGLOBIN: 1.3 % SAT (ref 0–3)
CHLORIDE SERPL-SCNC: 108 MMOL/L (ref 98–112)
CHLORIDE SERPL-SCNC: 112 MMOL/L (ref 98–112)
CK SERPL-CCNC: 490 U/L
CO2 SERPL-SCNC: 21 MMOL/L (ref 21–32)
CO2 SERPL-SCNC: 22 MMOL/L (ref 21–32)
CREAT BLD-MCNC: 1.33 MG/DL
CREAT BLD-MCNC: 1.68 MG/DL
ERYTHROCYTE [DISTWIDTH] IN BLOOD BY AUTOMATED COUNT: 14.9 %
ERYTHROCYTE [DISTWIDTH] IN BLOOD BY AUTOMATED COUNT: 14.9 %
FIBRINOGEN: 345 MG/DL (ref 200–446)
FIBRINOGEN: 437 MG/DL (ref 200–446)
FIO2: 40 %
FIO2: 50 %
GLOBULIN PLAS-MCNC: 3.2 G/DL (ref 2.8–4.4)
GLUCOSE BLD-MCNC: 138 MG/DL (ref 70–99)
GLUCOSE BLD-MCNC: 152 MG/DL (ref 70–99)
GLUCOSE BLD-MCNC: 171 MG/DL (ref 70–99)
GLUCOSE BLD-MCNC: 183 MG/DL (ref 70–99)
GLUCOSE BLD-MCNC: 203 MG/DL (ref 70–99)
GLUCOSE BLD-MCNC: 207 MG/DL (ref 70–99)
GLUCOSE BLD-MCNC: 214 MG/DL (ref 70–99)
GLUCOSE BLD-MCNC: 218 MG/DL (ref 70–99)
GLUCOSE BLD-MCNC: 218 MG/DL (ref 70–99)
GLUCOSE BLD-MCNC: 219 MG/DL (ref 70–99)
GLUCOSE BLD-MCNC: 244 MG/DL (ref 70–99)
GLUCOSE BLD-MCNC: 249 MG/DL (ref 70–99)
GLUCOSE BLD-MCNC: 271 MG/DL (ref 70–99)
HAV IGM SER QL: 1.9 MG/DL (ref 1.6–2.6)
HCT VFR BLD AUTO: 34.8 %
HCT VFR BLD AUTO: 36 %
HGB BLD-MCNC: 11.6 G/DL
HGB BLD-MCNC: 11.9 G/DL
INR BLD: 1.28 (ref 0.89–1.11)
INR BLD: 1.41 (ref 0.89–1.11)
IONIZED CALCIUM: 1.27 MMOL/L (ref 1.12–1.32)
ISTAT ACTIVATED CLOTTING TIME: 114 SECONDS (ref 74–137)
ISTAT ACTIVATED CLOTTING TIME: 120 SECONDS (ref 74–137)
ISTAT ACTIVATED CLOTTING TIME: 131 SECONDS (ref 74–137)
ISTAT ACTIVATED CLOTTING TIME: 439 SECONDS (ref 74–137)
ISTAT BLOOD GAS BASE EXCESS: -3 MMOL/L
ISTAT BLOOD GAS BASE EXCESS: -4 MMOL/L (ref ?–30)
ISTAT BLOOD GAS BASE EXCESS: -4 MMOL/L (ref ?–30)
ISTAT BLOOD GAS HCO3: 22 MEQ/L (ref 22–26)
ISTAT BLOOD GAS HCO3: 22.5 MEQ/L (ref 22–26)
ISTAT BLOOD GAS HCO3: 23.6 MEQ/L
ISTAT BLOOD GAS O2 SATURATION: 100 %
ISTAT BLOOD GAS O2 SATURATION: 91 % (ref 92–100)
ISTAT BLOOD GAS O2 SATURATION: 98 % (ref 92–100)
ISTAT BLOOD GAS PCO2: 43.4 MMHG (ref 35–45)
ISTAT BLOOD GAS PCO2: 43.7 MMHG (ref 35–45)
ISTAT BLOOD GAS PCO2: 52.3 MMHG
ISTAT BLOOD GAS PH: 7.26
ISTAT BLOOD GAS PH: 7.31 (ref 7.35–7.45)
ISTAT BLOOD GAS PH: 7.32 (ref 7.35–7.45)
ISTAT BLOOD GAS PO2: 109 MMHG (ref 80–105)
ISTAT BLOOD GAS PO2: 219 MMHG
ISTAT BLOOD GAS PO2: <70 MMHG (ref 80–105)
ISTAT BLOOD GAS TCO2: 23 MMOL/L (ref 22–32)
ISTAT BLOOD GAS TCO2: 24 MMOL/L (ref 22–32)
ISTAT BLOOD GAS TCO2: 25 MMOL/L (ref 22–32)
ISTAT HEMATOCRIT: 26 %
ISTAT HEMATOCRIT: 33 %
ISTAT HEMATOCRIT: 35 %
ISTAT IONIZED CALCIUM: 1.2 MMOL/L (ref 1.12–1.32)
ISTAT IONIZED CALCIUM: 1.26 MMOL/L (ref 1.12–1.32)
ISTAT IONIZED CALCIUM: 1.31 MMOL/L (ref 1.12–1.32)
ISTAT PATIENT TEMPERATURE: 37 DEGREE
ISTAT PATIENT TEMPERATURE: 97.2 DEGREE
ISTAT POTASSIUM: 4.5 MMOL/L (ref 3.6–5.1)
ISTAT SODIUM: 140 MMOL/L (ref 136–145)
ISTAT SODIUM: 141 MMOL/L (ref 136–145)
ISTAT SODIUM: 142 MMOL/L (ref 136–145)
M PROTEIN MFR SERPL ELPH: 6.2 G/DL (ref 6.4–8.2)
MCH RBC QN AUTO: 30.4 PG (ref 26–34)
MCH RBC QN AUTO: 30.6 PG (ref 26–34)
MCHC RBC AUTO-ENTMCNC: 33.1 G/DL (ref 31–37)
MCHC RBC AUTO-ENTMCNC: 33.3 G/DL (ref 31–37)
MCV RBC AUTO: 91.1 FL
MCV RBC AUTO: 92.5 FL
METHEMOGLOBIN: 1.4 % SAT (ref 0.4–1.5)
METHEMOGLOBIN: 1.5 % SAT (ref 0.4–1.5)
NITRIC OXIDE: 40 PPM
OSMOLALITY SERPL CALC.SUM OF ELEC: 296 MOSM/KG (ref 275–295)
P AXIS: 51 DEGREES
P AXIS: 63 DEGREES
P-R INTERVAL: 172 MS
P-R INTERVAL: 182 MS
P/F RATIO: 203.7 MMHG
P/F RATIO: 206.2 MMHG
PATIENT TEMPERATURE: 97 F
PATIENT TEMPERATURE: 99.1 F
PEEP: 5 CM H2O
PEEP: 5 CM H2O
PLATELET # BLD AUTO: 161 10(3)UL (ref 150–450)
PLATELET # BLD AUTO: 180 10(3)UL (ref 150–450)
PLATELET # BLD AUTO: 290 10(3)UL (ref 150–450)
POTASSIUM BLOOD GAS: 4.7 MMOL/L (ref 3.6–5.1)
POTASSIUM SERPL-SCNC: 4.4 MMOL/L (ref 3.5–5.1)
POTASSIUM SERPL-SCNC: 4.6 MMOL/L (ref 3.5–5.1)
PSA SERPL DL<=0.01 NG/ML-MCNC: 16.3 SECONDS (ref 12.2–14.5)
PSA SERPL DL<=0.01 NG/ML-MCNC: 17.6 SECONDS (ref 12.2–14.5)
Q-T INTERVAL: 378 MS
Q-T INTERVAL: 412 MS
QRS DURATION: 160 MS
QRS DURATION: 168 MS
QTC CALCULATION (BEZET): 487 MS
QTC CALCULATION (BEZET): 515 MS
R AXIS: 121 DEGREES
R AXIS: 125 DEGREES
RBC # BLD AUTO: 3.82 X10(6)UL
RBC # BLD AUTO: 3.89 X10(6)UL
RH BLOOD TYPE: POSITIVE
SODIUM BLOOD GAS: 142 MMOL/L (ref 136–144)
SODIUM SERPL-SCNC: 138 MMOL/L (ref 136–145)
SODIUM SERPL-SCNC: 143 MMOL/L (ref 136–145)
T AXIS: -27 DEGREES
T AXIS: 0 DEGREES
TIDAL VOLUME: 500 ML
TIDAL VOLUME: 600 ML
TOTAL HEMOGLOBIN: 10.4 G/DL
TOTAL HEMOGLOBIN: 11.7 G/DL
TROPONIN I SERPL-MCNC: 9.37 NG/ML (ref ?–0.04)
VENT RATE: 15 /MIN
VENT RATE: 16 /MIN
VENTRICULAR RATE: 100 BPM
VENTRICULAR RATE: 94 BPM
WBC # BLD AUTO: 13 X10(3) UL (ref 4–11)
WBC # BLD AUTO: 21 X10(3) UL (ref 4–11)

## 2021-08-05 PROCEDURE — 85730 THROMBOPLASTIN TIME PARTIAL: CPT | Performed by: THORACIC SURGERY (CARDIOTHORACIC VASCULAR SURGERY)

## 2021-08-05 PROCEDURE — 84132 ASSAY OF SERUM POTASSIUM: CPT

## 2021-08-05 PROCEDURE — 82803 BLOOD GASES ANY COMBINATION: CPT

## 2021-08-05 PROCEDURE — 76942 ECHO GUIDE FOR BIOPSY: CPT | Performed by: ANESTHESIOLOGY

## 2021-08-05 PROCEDURE — 82330 ASSAY OF CALCIUM: CPT

## 2021-08-05 PROCEDURE — B41FYZZ FLUOROSCOPY OF RIGHT LOWER EXTREMITY ARTERIES USING OTHER CONTRAST: ICD-10-PCS | Performed by: STUDENT IN AN ORGANIZED HEALTH CARE EDUCATION/TRAINING PROGRAM

## 2021-08-05 PROCEDURE — 85027 COMPLETE CBC AUTOMATED: CPT | Performed by: PHYSICIAN ASSISTANT

## 2021-08-05 PROCEDURE — 84484 ASSAY OF TROPONIN QUANT: CPT | Performed by: PHYSICIAN ASSISTANT

## 2021-08-05 PROCEDURE — 93454 CORONARY ARTERY ANGIO S&I: CPT

## 2021-08-05 PROCEDURE — 86850 RBC ANTIBODY SCREEN: CPT | Performed by: PHYSICIAN ASSISTANT

## 2021-08-05 PROCEDURE — 93010 ELECTROCARDIOGRAM REPORT: CPT | Performed by: INTERNAL MEDICINE

## 2021-08-05 PROCEDURE — 71045 X-RAY EXAM CHEST 1 VIEW: CPT | Performed by: PHYSICIAN ASSISTANT

## 2021-08-05 PROCEDURE — 99153 MOD SED SAME PHYS/QHP EA: CPT

## 2021-08-05 PROCEDURE — 85014 HEMATOCRIT: CPT

## 2021-08-05 PROCEDURE — 33967 INSERT I-AORT PERCUT DEVICE: CPT

## 2021-08-05 PROCEDURE — 93312 ECHO TRANSESOPHAGEAL: CPT | Performed by: ANESTHESIOLOGY

## 2021-08-05 PROCEDURE — 82550 ASSAY OF CK (CPK): CPT | Performed by: PHYSICIAN ASSISTANT

## 2021-08-05 PROCEDURE — 85610 PROTHROMBIN TIME: CPT | Performed by: THORACIC SURGERY (CARDIOTHORACIC VASCULAR SURGERY)

## 2021-08-05 PROCEDURE — 02100Z9 BYPASS CORONARY ARTERY, ONE ARTERY FROM LEFT INTERNAL MAMMARY, OPEN APPROACH: ICD-10-PCS | Performed by: THORACIC SURGERY (CARDIOTHORACIC VASCULAR SURGERY)

## 2021-08-05 PROCEDURE — 06BQ3ZZ EXCISION OF LEFT SAPHENOUS VEIN, PERCUTANEOUS APPROACH: ICD-10-PCS | Performed by: THORACIC SURGERY (CARDIOTHORACIC VASCULAR SURGERY)

## 2021-08-05 PROCEDURE — 83050 HGB METHEMOGLOBIN QUAN: CPT | Performed by: ANESTHESIOLOGY

## 2021-08-05 PROCEDURE — 06BP3ZZ EXCISION OF RIGHT SAPHENOUS VEIN, PERCUTANEOUS APPROACH: ICD-10-PCS | Performed by: THORACIC SURGERY (CARDIOTHORACIC VASCULAR SURGERY)

## 2021-08-05 PROCEDURE — 93005 ELECTROCARDIOGRAM TRACING: CPT

## 2021-08-05 PROCEDURE — 84295 ASSAY OF SERUM SODIUM: CPT

## 2021-08-05 PROCEDURE — 84132 ASSAY OF SERUM POTASSIUM: CPT | Performed by: ANESTHESIOLOGY

## 2021-08-05 PROCEDURE — 85384 FIBRINOGEN ACTIVITY: CPT | Performed by: PHYSICIAN ASSISTANT

## 2021-08-05 PROCEDURE — 86920 COMPATIBILITY TEST SPIN: CPT

## 2021-08-05 PROCEDURE — 99152 MOD SED SAME PHYS/QHP 5/>YRS: CPT

## 2021-08-05 PROCEDURE — 85018 HEMOGLOBIN: CPT | Performed by: ANESTHESIOLOGY

## 2021-08-05 PROCEDURE — 82375 ASSAY CARBOXYHB QUANT: CPT | Performed by: ANESTHESIOLOGY

## 2021-08-05 PROCEDURE — 85384 FIBRINOGEN ACTIVITY: CPT | Performed by: THORACIC SURGERY (CARDIOTHORACIC VASCULAR SURGERY)

## 2021-08-05 PROCEDURE — 85730 THROMBOPLASTIN TIME PARTIAL: CPT | Performed by: PHYSICIAN ASSISTANT

## 2021-08-05 PROCEDURE — 82803 BLOOD GASES ANY COMBINATION: CPT | Performed by: ANESTHESIOLOGY

## 2021-08-05 PROCEDURE — 51702 INSERT TEMP BLADDER CATH: CPT

## 2021-08-05 PROCEDURE — 82330 ASSAY OF CALCIUM: CPT | Performed by: ANESTHESIOLOGY

## 2021-08-05 PROCEDURE — 5A02210 ASSISTANCE WITH CARDIAC OUTPUT USING BALLOON PUMP, CONTINUOUS: ICD-10-PCS | Performed by: STUDENT IN AN ORGANIZED HEALTH CARE EDUCATION/TRAINING PROGRAM

## 2021-08-05 PROCEDURE — 85347 COAGULATION TIME ACTIVATED: CPT

## 2021-08-05 PROCEDURE — P9045 ALBUMIN (HUMAN), 5%, 250 ML: HCPCS | Performed by: THORACIC SURGERY (CARDIOTHORACIC VASCULAR SURGERY)

## 2021-08-05 PROCEDURE — B211YZZ FLUOROSCOPY OF MULTIPLE CORONARY ARTERIES USING OTHER CONTRAST: ICD-10-PCS | Performed by: STUDENT IN AN ORGANIZED HEALTH CARE EDUCATION/TRAINING PROGRAM

## 2021-08-05 PROCEDURE — 84295 ASSAY OF SERUM SODIUM: CPT | Performed by: ANESTHESIOLOGY

## 2021-08-05 PROCEDURE — 85049 AUTOMATED PLATELET COUNT: CPT | Performed by: THORACIC SURGERY (CARDIOTHORACIC VASCULAR SURGERY)

## 2021-08-05 PROCEDURE — 5A1221Z PERFORMANCE OF CARDIAC OUTPUT, CONTINUOUS: ICD-10-PCS | Performed by: THORACIC SURGERY (CARDIOTHORACIC VASCULAR SURGERY)

## 2021-08-05 PROCEDURE — 85027 COMPLETE CBC AUTOMATED: CPT | Performed by: HOSPITALIST

## 2021-08-05 PROCEDURE — 021109W BYPASS CORONARY ARTERY, TWO ARTERIES FROM AORTA WITH AUTOLOGOUS VENOUS TISSUE, OPEN APPROACH: ICD-10-PCS | Performed by: THORACIC SURGERY (CARDIOTHORACIC VASCULAR SURGERY)

## 2021-08-05 PROCEDURE — 85610 PROTHROMBIN TIME: CPT | Performed by: PHYSICIAN ASSISTANT

## 2021-08-05 PROCEDURE — 83735 ASSAY OF MAGNESIUM: CPT | Performed by: PHYSICIAN ASSISTANT

## 2021-08-05 PROCEDURE — 93306 TTE W/DOPPLER COMPLETE: CPT | Performed by: INTERNAL MEDICINE

## 2021-08-05 PROCEDURE — 80053 COMPREHEN METABOLIC PANEL: CPT | Performed by: HOSPITALIST

## 2021-08-05 PROCEDURE — 86901 BLOOD TYPING SEROLOGIC RH(D): CPT | Performed by: PHYSICIAN ASSISTANT

## 2021-08-05 PROCEDURE — 85730 THROMBOPLASTIN TIME PARTIAL: CPT | Performed by: INTERNAL MEDICINE

## 2021-08-05 PROCEDURE — 80048 BASIC METABOLIC PNL TOTAL CA: CPT | Performed by: PHYSICIAN ASSISTANT

## 2021-08-05 PROCEDURE — S0028 INJECTION, FAMOTIDINE, 20 MG: HCPCS | Performed by: ANESTHESIOLOGY

## 2021-08-05 PROCEDURE — 86900 BLOOD TYPING SEROLOGIC ABO: CPT | Performed by: PHYSICIAN ASSISTANT

## 2021-08-05 PROCEDURE — 83605 ASSAY OF LACTIC ACID: CPT | Performed by: ANESTHESIOLOGY

## 2021-08-05 PROCEDURE — 82962 GLUCOSE BLOOD TEST: CPT

## 2021-08-05 DEVICE — IMPLANTABLE DEVICE
Type: IMPLANTABLE DEVICE | Site: CHEST | Status: FUNCTIONAL
Brand: STERNALOCK® BLU SYSTEM

## 2021-08-05 RX ORDER — ACETAMINOPHEN 10 MG/ML
1000 INJECTION, SOLUTION INTRAVENOUS EVERY 6 HOURS PRN
Status: DISPENSED | OUTPATIENT
Start: 2021-08-05 | End: 2021-08-06

## 2021-08-05 RX ORDER — POTASSIUM CHLORIDE 14.9 MG/ML
20 INJECTION INTRAVENOUS AS NEEDED
Status: DISCONTINUED | OUTPATIENT
Start: 2021-08-05 | End: 2021-08-11

## 2021-08-05 RX ORDER — CHLORHEXIDINE GLUCONATE 0.12 MG/ML
15 RINSE ORAL
Status: DISCONTINUED | OUTPATIENT
Start: 2021-08-05 | End: 2021-08-07

## 2021-08-05 RX ORDER — ALBUMIN, HUMAN INJ 5% 5 %
500 SOLUTION INTRAVENOUS ONCE
Status: COMPLETED | OUTPATIENT
Start: 2021-08-05 | End: 2021-08-05

## 2021-08-05 RX ORDER — DEXTROSE MONOHYDRATE 25 G/50ML
50 INJECTION, SOLUTION INTRAVENOUS
Status: DISCONTINUED | OUTPATIENT
Start: 2021-08-05 | End: 2021-08-11

## 2021-08-05 RX ORDER — DOBUTAMINE HYDROCHLORIDE 200 MG/100ML
INJECTION INTRAVENOUS CONTINUOUS PRN
Status: DISCONTINUED | OUTPATIENT
Start: 2021-08-05 | End: 2021-08-11

## 2021-08-05 RX ORDER — LIDOCAINE HYDROCHLORIDE 10 MG/ML
INJECTION, SOLUTION EPIDURAL; INFILTRATION; INTRACAUDAL; PERINEURAL
Status: COMPLETED
Start: 2021-08-05 | End: 2021-08-05

## 2021-08-05 RX ORDER — FAMOTIDINE 10 MG/ML
INJECTION, SOLUTION INTRAVENOUS AS NEEDED
Status: DISCONTINUED | OUTPATIENT
Start: 2021-08-05 | End: 2021-08-05 | Stop reason: SURG

## 2021-08-05 RX ORDER — ALBUMIN, HUMAN INJ 5% 5 %
250 SOLUTION INTRAVENOUS ONCE AS NEEDED
Status: DISCONTINUED | OUTPATIENT
Start: 2021-08-05 | End: 2021-08-11

## 2021-08-05 RX ORDER — PHENYLEPHRINE HCL 10 MG/ML
VIAL (ML) INJECTION AS NEEDED
Status: DISCONTINUED | OUTPATIENT
Start: 2021-08-05 | End: 2021-08-05 | Stop reason: SURG

## 2021-08-05 RX ORDER — MIDAZOLAM HYDROCHLORIDE 1 MG/ML
INJECTION INTRAMUSCULAR; INTRAVENOUS AS NEEDED
Status: DISCONTINUED | OUTPATIENT
Start: 2021-08-05 | End: 2021-08-05 | Stop reason: SURG

## 2021-08-05 RX ORDER — DEXMEDETOMIDINE HYDROCHLORIDE 4 UG/ML
INJECTION, SOLUTION INTRAVENOUS CONTINUOUS PRN
Status: DISCONTINUED | OUTPATIENT
Start: 2021-08-05 | End: 2021-08-05 | Stop reason: SURG

## 2021-08-05 RX ORDER — MORPHINE SULFATE 4 MG/ML
6 INJECTION, SOLUTION INTRAMUSCULAR; INTRAVENOUS EVERY 2 HOUR PRN
Status: DISCONTINUED | OUTPATIENT
Start: 2021-08-05 | End: 2021-08-11

## 2021-08-05 RX ORDER — DIPHENHYDRAMINE HYDROCHLORIDE 50 MG/ML
INJECTION INTRAMUSCULAR; INTRAVENOUS AS NEEDED
Status: DISCONTINUED | OUTPATIENT
Start: 2021-08-05 | End: 2021-08-05 | Stop reason: SURG

## 2021-08-05 RX ORDER — MELATONIN
3 NIGHTLY PRN
Status: DISCONTINUED | OUTPATIENT
Start: 2021-08-05 | End: 2021-08-11

## 2021-08-05 RX ORDER — SODIUM CHLORIDE 9 MG/ML
INJECTION, SOLUTION INTRAVENOUS CONTINUOUS
Status: DISCONTINUED | OUTPATIENT
Start: 2021-08-05 | End: 2021-08-07

## 2021-08-05 RX ORDER — MORPHINE SULFATE 2 MG/ML
2 INJECTION, SOLUTION INTRAMUSCULAR; INTRAVENOUS EVERY 2 HOUR PRN
Status: DISCONTINUED | OUTPATIENT
Start: 2021-08-05 | End: 2021-08-11

## 2021-08-05 RX ORDER — ASPIRIN 81 MG/1
81 TABLET, CHEWABLE ORAL DAILY
Status: DISCONTINUED | OUTPATIENT
Start: 2021-08-05 | End: 2021-08-05

## 2021-08-05 RX ORDER — SODIUM CHLORIDE 9 MG/ML
INJECTION, SOLUTION INTRAVENOUS
Status: DISCONTINUED | OUTPATIENT
Start: 2021-08-06 | End: 2021-08-05 | Stop reason: HOSPADM

## 2021-08-05 RX ORDER — POLYETHYLENE GLYCOL 3350 17 G/17G
17 POWDER, FOR SOLUTION ORAL DAILY PRN
Status: DISCONTINUED | OUTPATIENT
Start: 2021-08-05 | End: 2021-08-11

## 2021-08-05 RX ORDER — MORPHINE SULFATE 2 MG/ML
2 INJECTION, SOLUTION INTRAMUSCULAR; INTRAVENOUS EVERY 2 HOUR PRN
Status: DISCONTINUED | OUTPATIENT
Start: 2021-08-05 | End: 2021-08-05

## 2021-08-05 RX ORDER — IPRATROPIUM BROMIDE AND ALBUTEROL SULFATE 2.5; .5 MG/3ML; MG/3ML
3 SOLUTION RESPIRATORY (INHALATION) EVERY 4 HOURS PRN
Status: DISCONTINUED | OUTPATIENT
Start: 2021-08-05 | End: 2021-08-11

## 2021-08-05 RX ORDER — BISACODYL 10 MG
10 SUPPOSITORY, RECTAL RECTAL
Status: DISCONTINUED | OUTPATIENT
Start: 2021-08-05 | End: 2021-08-11

## 2021-08-05 RX ORDER — HEPARIN SODIUM 5000 [USP'U]/ML
INJECTION, SOLUTION INTRAVENOUS; SUBCUTANEOUS
Status: COMPLETED
Start: 2021-08-05 | End: 2021-08-05

## 2021-08-05 RX ORDER — DOBUTAMINE HYDROCHLORIDE 200 MG/100ML
INJECTION INTRAVENOUS CONTINUOUS PRN
Status: DISCONTINUED | OUTPATIENT
Start: 2021-08-05 | End: 2021-08-05 | Stop reason: SURG

## 2021-08-05 RX ORDER — DEXMEDETOMIDINE HYDROCHLORIDE 4 UG/ML
INJECTION, SOLUTION INTRAVENOUS CONTINUOUS
Status: DISCONTINUED | OUTPATIENT
Start: 2021-08-05 | End: 2021-08-06

## 2021-08-05 RX ORDER — MIDAZOLAM HYDROCHLORIDE 1 MG/ML
1 INJECTION INTRAMUSCULAR; INTRAVENOUS EVERY 30 MIN PRN
Status: DISCONTINUED | OUTPATIENT
Start: 2021-08-05 | End: 2021-08-11

## 2021-08-05 RX ORDER — CEFAZOLIN SODIUM/WATER 2 G/20 ML
2 SYRINGE (ML) INTRAVENOUS EVERY 8 HOURS
Status: COMPLETED | OUTPATIENT
Start: 2021-08-05 | End: 2021-08-07

## 2021-08-05 RX ORDER — ROCURONIUM BROMIDE 10 MG/ML
INJECTION, SOLUTION INTRAVENOUS AS NEEDED
Status: DISCONTINUED | OUTPATIENT
Start: 2021-08-05 | End: 2021-08-05 | Stop reason: SURG

## 2021-08-05 RX ORDER — HYDROCODONE BITARTRATE AND ACETAMINOPHEN 10; 325 MG/1; MG/1
1 TABLET ORAL EVERY 4 HOURS PRN
Status: DISCONTINUED | OUTPATIENT
Start: 2021-08-05 | End: 2021-08-11

## 2021-08-05 RX ORDER — MORPHINE SULFATE 4 MG/ML
4 INJECTION, SOLUTION INTRAMUSCULAR; INTRAVENOUS ONCE
Status: COMPLETED | OUTPATIENT
Start: 2021-08-05 | End: 2021-08-05

## 2021-08-05 RX ORDER — METHYLPREDNISOLONE SODIUM SUCCINATE 500 MG/1
INJECTION, POWDER, FOR SOLUTION INTRAMUSCULAR; INTRAVENOUS AS NEEDED
Status: DISCONTINUED | OUTPATIENT
Start: 2021-08-05 | End: 2021-08-05 | Stop reason: SURG

## 2021-08-05 RX ORDER — MORPHINE SULFATE 4 MG/ML
INJECTION, SOLUTION INTRAMUSCULAR; INTRAVENOUS
Status: COMPLETED
Start: 2021-08-05 | End: 2021-08-05

## 2021-08-05 RX ORDER — SODIUM PHOSPHATE, DIBASIC AND SODIUM PHOSPHATE, MONOBASIC 7; 19 G/133ML; G/133ML
1 ENEMA RECTAL ONCE AS NEEDED
Status: DISCONTINUED | OUTPATIENT
Start: 2021-08-05 | End: 2021-08-11

## 2021-08-05 RX ORDER — MIDAZOLAM HYDROCHLORIDE 1 MG/ML
INJECTION INTRAMUSCULAR; INTRAVENOUS
Status: COMPLETED
Start: 2021-08-05 | End: 2021-08-05

## 2021-08-05 RX ORDER — NITROGLYCERIN 20 MG/100ML
INJECTION INTRAVENOUS CONTINUOUS PRN
Status: DISCONTINUED | OUTPATIENT
Start: 2021-08-05 | End: 2021-08-11

## 2021-08-05 RX ORDER — ASPIRIN 300 MG
300 SUPPOSITORY, RECTAL RECTAL DAILY
Status: DISCONTINUED | OUTPATIENT
Start: 2021-08-06 | End: 2021-08-11

## 2021-08-05 RX ORDER — MAGNESIUM SULFATE 1 G/100ML
1 INJECTION INTRAVENOUS AS NEEDED
Status: DISCONTINUED | OUTPATIENT
Start: 2021-08-05 | End: 2021-08-11

## 2021-08-05 RX ORDER — MORPHINE SULFATE 4 MG/ML
4 INJECTION, SOLUTION INTRAMUSCULAR; INTRAVENOUS EVERY 2 HOUR PRN
Status: DISCONTINUED | OUTPATIENT
Start: 2021-08-05 | End: 2021-08-11

## 2021-08-05 RX ORDER — DEXTROSE AND SODIUM CHLORIDE 5; .45 G/100ML; G/100ML
INJECTION, SOLUTION INTRAVENOUS CONTINUOUS
Status: DISCONTINUED | OUTPATIENT
Start: 2021-08-05 | End: 2021-08-07

## 2021-08-05 RX ORDER — VANCOMYCIN HYDROCHLORIDE 1 G/20ML
INJECTION, POWDER, LYOPHILIZED, FOR SOLUTION INTRAVENOUS AS NEEDED
Status: DISCONTINUED | OUTPATIENT
Start: 2021-08-05 | End: 2021-08-05 | Stop reason: SURG

## 2021-08-05 RX ORDER — MORPHINE SULFATE 4 MG/ML
4 INJECTION, SOLUTION INTRAMUSCULAR; INTRAVENOUS EVERY 2 HOUR PRN
Status: DISCONTINUED | OUTPATIENT
Start: 2021-08-05 | End: 2021-08-05

## 2021-08-05 RX ORDER — MAGNESIUM SULFATE HEPTAHYDRATE 40 MG/ML
2 INJECTION, SOLUTION INTRAVENOUS AS NEEDED
Status: DISCONTINUED | OUTPATIENT
Start: 2021-08-05 | End: 2021-08-11

## 2021-08-05 RX ORDER — HYDROCODONE BITARTRATE AND ACETAMINOPHEN 10; 325 MG/1; MG/1
2 TABLET ORAL EVERY 4 HOURS PRN
Status: DISCONTINUED | OUTPATIENT
Start: 2021-08-05 | End: 2021-08-11

## 2021-08-05 RX ORDER — ONDANSETRON 2 MG/ML
4 INJECTION INTRAMUSCULAR; INTRAVENOUS EVERY 6 HOURS PRN
Status: DISCONTINUED | OUTPATIENT
Start: 2021-08-05 | End: 2021-08-11

## 2021-08-05 RX ORDER — POTASSIUM CHLORIDE 29.8 MG/ML
40 INJECTION INTRAVENOUS AS NEEDED
Status: DISCONTINUED | OUTPATIENT
Start: 2021-08-05 | End: 2021-08-11

## 2021-08-05 RX ORDER — PHYTONADIONE 10 MG/ML
INJECTION, EMULSION INTRAMUSCULAR; INTRAVENOUS; SUBCUTANEOUS AS NEEDED
Status: DISCONTINUED | OUTPATIENT
Start: 2021-08-05 | End: 2021-08-05 | Stop reason: SURG

## 2021-08-05 RX ORDER — PANTOPRAZOLE SODIUM 40 MG/1
40 TABLET, DELAYED RELEASE ORAL
Status: DISCONTINUED | OUTPATIENT
Start: 2021-08-06 | End: 2021-08-11

## 2021-08-05 RX ORDER — ASPIRIN 325 MG
325 TABLET, DELAYED RELEASE (ENTERIC COATED) ORAL DAILY
Status: DISCONTINUED | OUTPATIENT
Start: 2021-08-06 | End: 2021-08-11

## 2021-08-05 RX ORDER — HEPARIN SODIUM 1000 [USP'U]/ML
INJECTION, SOLUTION INTRAVENOUS; SUBCUTANEOUS AS NEEDED
Status: DISCONTINUED | OUTPATIENT
Start: 2021-08-05 | End: 2021-08-05 | Stop reason: SURG

## 2021-08-05 RX ORDER — PROTAMINE SULFATE 10 MG/ML
INJECTION, SOLUTION INTRAVENOUS AS NEEDED
Status: DISCONTINUED | OUTPATIENT
Start: 2021-08-05 | End: 2021-08-05 | Stop reason: SURG

## 2021-08-05 RX ORDER — VANCOMYCIN HYDROCHLORIDE
15 EVERY 12 HOURS
Status: COMPLETED | OUTPATIENT
Start: 2021-08-05 | End: 2021-08-06

## 2021-08-05 RX ORDER — DOCUSATE SODIUM 100 MG/1
100 CAPSULE, LIQUID FILLED ORAL 2 TIMES DAILY
Status: DISCONTINUED | OUTPATIENT
Start: 2021-08-05 | End: 2021-08-11

## 2021-08-05 RX ADMIN — HEPARIN SODIUM 37000 UNITS: 1000 INJECTION, SOLUTION INTRAVENOUS; SUBCUTANEOUS at 12:05:00

## 2021-08-05 RX ADMIN — PHENYLEPHRINE HCL 100 MCG: 10 MG/ML VIAL (ML) INJECTION at 10:19:00

## 2021-08-05 RX ADMIN — ROCURONIUM BROMIDE 50 MG: 10 INJECTION, SOLUTION INTRAVENOUS at 11:31:00

## 2021-08-05 RX ADMIN — SODIUM CHLORIDE: 9 INJECTION, SOLUTION INTRAVENOUS at 10:02:00

## 2021-08-05 RX ADMIN — SODIUM CHLORIDE: 9 INJECTION, SOLUTION INTRAVENOUS at 15:46:00

## 2021-08-05 RX ADMIN — PHENYLEPHRINE HCL 100 MCG: 10 MG/ML VIAL (ML) INJECTION at 11:52:00

## 2021-08-05 RX ADMIN — PROTAMINE SULFATE 50 MG: 10 INJECTION, SOLUTION INTRAVENOUS at 14:06:00

## 2021-08-05 RX ADMIN — PHENYLEPHRINE HCL 100 MCG: 10 MG/ML VIAL (ML) INJECTION at 11:20:00

## 2021-08-05 RX ADMIN — ROCURONIUM BROMIDE 100 MG: 10 INJECTION, SOLUTION INTRAVENOUS at 10:15:00

## 2021-08-05 RX ADMIN — PROTAMINE SULFATE 50 MG: 10 INJECTION, SOLUTION INTRAVENOUS at 14:11:00

## 2021-08-05 RX ADMIN — VANCOMYCIN HYDROCHLORIDE 1000 MG: 1 INJECTION, POWDER, LYOPHILIZED, FOR SOLUTION INTRAVENOUS at 10:30:00

## 2021-08-05 RX ADMIN — PROTAMINE SULFATE 50 MG: 10 INJECTION, SOLUTION INTRAVENOUS at 14:07:00

## 2021-08-05 RX ADMIN — PHENYLEPHRINE HCL 100 MCG: 10 MG/ML VIAL (ML) INJECTION at 12:12:00

## 2021-08-05 RX ADMIN — FAMOTIDINE 20 MG: 10 INJECTION, SOLUTION INTRAVENOUS at 10:02:00

## 2021-08-05 RX ADMIN — DEXMEDETOMIDINE HYDROCHLORIDE 0.5 MCG/KG/HR: 4 INJECTION, SOLUTION INTRAVENOUS at 10:40:00

## 2021-08-05 RX ADMIN — PHENYLEPHRINE HCL 100 MCG: 10 MG/ML VIAL (ML) INJECTION at 11:25:00

## 2021-08-05 RX ADMIN — ROCURONIUM BROMIDE 100 MG: 10 INJECTION, SOLUTION INTRAVENOUS at 12:12:00

## 2021-08-05 RX ADMIN — ROCURONIUM BROMIDE 50 MG: 10 INJECTION, SOLUTION INTRAVENOUS at 13:34:00

## 2021-08-05 RX ADMIN — PHENYLEPHRINE HCL 100 MCG: 10 MG/ML VIAL (ML) INJECTION at 11:15:00

## 2021-08-05 RX ADMIN — PROTAMINE SULFATE 50 MG: 10 INJECTION, SOLUTION INTRAVENOUS at 14:10:00

## 2021-08-05 RX ADMIN — MIDAZOLAM HYDROCHLORIDE 4 MG: 1 INJECTION INTRAMUSCULAR; INTRAVENOUS at 12:12:00

## 2021-08-05 RX ADMIN — PROTAMINE SULFATE 50 MG: 10 INJECTION, SOLUTION INTRAVENOUS at 14:13:00

## 2021-08-05 RX ADMIN — DOBUTAMINE HYDROCHLORIDE 3 MCG/KG/MIN: 200 INJECTION INTRAVENOUS at 14:03:00

## 2021-08-05 RX ADMIN — PROTAMINE SULFATE 50 MG: 10 INJECTION, SOLUTION INTRAVENOUS at 14:05:00

## 2021-08-05 RX ADMIN — MIDAZOLAM HYDROCHLORIDE 2 MG: 1 INJECTION INTRAMUSCULAR; INTRAVENOUS at 10:15:00

## 2021-08-05 RX ADMIN — MIDAZOLAM HYDROCHLORIDE 2 MG: 1 INJECTION INTRAMUSCULAR; INTRAVENOUS at 10:03:00

## 2021-08-05 RX ADMIN — PROTAMINE SULFATE 50 MG: 10 INJECTION, SOLUTION INTRAVENOUS at 14:12:00

## 2021-08-05 RX ADMIN — PHYTONADIONE 5 MG: 10 INJECTION, EMULSION INTRAMUSCULAR; INTRAVENOUS; SUBCUTANEOUS at 14:10:00

## 2021-08-05 RX ADMIN — DOBUTAMINE HYDROCHLORIDE 5 MCG/KG/MIN: 200 INJECTION INTRAVENOUS at 13:56:00

## 2021-08-05 RX ADMIN — Medication 50 ML: at 14:28:00

## 2021-08-05 RX ADMIN — METHYLPREDNISOLONE SODIUM SUCCINATE 500 MG: 500 INJECTION, POWDER, FOR SOLUTION INTRAMUSCULAR; INTRAVENOUS at 10:35:00

## 2021-08-05 RX ADMIN — HEPARIN SODIUM 5000 UNITS: 1000 INJECTION, SOLUTION INTRAVENOUS; SUBCUTANEOUS at 11:55:00

## 2021-08-05 RX ADMIN — PHENYLEPHRINE HCL 100 MCG: 10 MG/ML VIAL (ML) INJECTION at 11:41:00

## 2021-08-05 RX ADMIN — MIDAZOLAM HYDROCHLORIDE 2 MG: 1 INJECTION INTRAMUSCULAR; INTRAVENOUS at 13:35:00

## 2021-08-05 RX ADMIN — DIPHENHYDRAMINE HYDROCHLORIDE 50 MG: 50 INJECTION INTRAMUSCULAR; INTRAVENOUS at 10:02:00

## 2021-08-05 RX ADMIN — PROTAMINE SULFATE 50 MG: 10 INJECTION, SOLUTION INTRAVENOUS at 14:08:00

## 2021-08-05 RX ADMIN — PHYTONADIONE 5 MG: 10 INJECTION, EMULSION INTRAMUSCULAR; INTRAVENOUS; SUBCUTANEOUS at 14:09:00

## 2021-08-05 RX ADMIN — PHENYLEPHRINE HCL 100 MCG: 10 MG/ML VIAL (ML) INJECTION at 11:31:00

## 2021-08-05 RX ADMIN — PROTAMINE SULFATE 50 MG: 10 INJECTION, SOLUTION INTRAVENOUS at 14:09:00

## 2021-08-05 NOTE — ANESTHESIA PROCEDURE NOTES
Airway  Date/Time: 8/5/2021 10:17 AM  Urgency: elective      General Information and Staff    Patient location during procedure: OR  Anesthesiologist: Sami Jay MD  Performed: anesthesiologist     Indications and Patient Condition  Indication

## 2021-08-05 NOTE — ANESTHESIA PREPROCEDURE EVALUATION
PRE-OP EVALUATION    Patient Name: Wyatt Damon    Admit Diagnosis: NSTEMI (non-ST elevated [facility] infarction) [facility] [I21.4]    Pre-op Diagnosis: coronary artery disease    CORONARY ARTERY BYPASS GRAFT    Anesthesia Procedure: CORONARY ARTERY BYPASS Continuous  rosuvastatin (CRESTOR) tab 40 mg, 40 mg, Oral, Nightly  glucose (DEX4) oral liquid 15 g, 15 g, Oral, Q15 Min PRN   Or  glucose-vitamin C (DEX-4) chewable tab 4 tablet, 4 tablet, Oral, Q15 Min PRN   Or  dextrose 50 % injection 50 mL, 50 mL, Intr Rfl: , 8/3/2021 at 2100  Insulin Aspart Pen (NOVOLOG FLEXPEN) 100 UNIT/ML Subcutaneous Solution Pen-injector, Inject 90 Units into the skin 2 (two) times a day.  With breakfast and dinner, Disp: , Rfl: , 8/4/2021 at 0900  Icosapent Ethyl (VASCEPA) 1 g Oral Neuro/Psych             (+) TIA    (+) neuromuscular disease             Gout  Sarcoidosis  Aortic balloon pump placed this am in cath lab -- per ICU nurse currently clotted off and not drawing back or flushing        Past Surgical History:   Procedure Lat 10/1/09    Performed by Nancy Chowdhury at [address] [place] [address]   • UPPER GI ENDOSCOPY,DIAGNOSIS  10/1/2009    gastritis, esophagitis     Social History    Tobacco Use      Smoking status: Former Smoker        Packs/day: 1.00        Years: 15.00 Admission:  **None**

## 2021-08-05 NOTE — PLAN OF CARE
Pt received from CTU 8. Upon arrival to CNICU, pt complained of chest pain rating it 5-6/10. No associated n/v or arm pain. Ntg gtt tirated. Once settled, pt rated pain 1/10.  Pt was just slightly dyspneic when standing at the bedside to void but denied any

## 2021-08-05 NOTE — ANESTHESIA POSTPROCEDURE EVALUATION
[address] Patient Status:  Inpatient   Age/Gender 72year old male MRN [id number]   [facility] 6NE-A Attending Gordo Mariscal MD   Hosp Day # 1 PCP Melany Mccoy MD       Anesthesia Post-op Note    CORONARY ART

## 2021-08-05 NOTE — PROGRESS NOTES
Anesthesia Ventilator Management    Patient is s/p 3V CABG  POD#0. Patient is currently sedated and intubated. Vent settings: PRVC 600/14/70%/5   ABG, CXR pending    @ 3  Dex@ 0.5    Will wean FiO2 and NO as tolerated.   Plan for extubation after CPAP

## 2021-08-05 NOTE — PLAN OF CARE
Pt arrived from ED with son and wife at bedside. Heparin gtt infusing per ACS protocol. Nitrogtt infusing at 5mcg/min. PTA meds and admission navigator completed with pt. A&Ox4. O2 sat WNL on RA. Denies chest pain at this time. NSR on tele with BBB.  Co

## 2021-08-05 NOTE — PROGRESS NOTES
[facility] Cardiology Progress Note       SUBJECTIVE:    Interval History:  -Worsened chest pain at 9PM last night. Improved from 9/10->1/10 with low dose nitroglycerin drip and morphine.  Patient then slept without issue until 2AM  -Repeat chest pain at 2AM despi 3. 4       Coagulation:  Recent Labs   Lab 08/04/21  1438 08/04/21  1537 08/04/21  0915   DDIMER  --  0.43  --    PTT  --  33.1 44.3*   INR  --  0.99  --    HCT 43.1  --   --    HGB 14.0  --   --    .0  --   --        Last Lipid Panel:  No results fo

## 2021-08-05 NOTE — H&P
General Medicine H&P     Patient presents with:  Chest Pain Angina       PCP: Parvez Tucker MD    Attempted to see earlier, in OR    History of Present Illness: Patient is a 72year old male with PMH including but not limited to obesity, DM, HTN, HL, MAR[facility]   • EGD N/A 9/10/2020    Procedure: ESOPHAGOGASTRODUODENOSCOPY, COLONOSCOPY, POSSIBLE BIOPSY, POSSIBLE POLYPECTOMY 86507, 56085;  Surgeon: Angela Fuentes MD;  Location: [place] [address] Smoking status: Former Smoker        Packs/day: 1.00        Years: 15.00        Pack years: 13        Start date: 1971        Quit date: 10/1/1987        Years since quittin.8      Smokeless tobacco: Never Used    Alcohol use: Yes      Comment: lottie 08/05/2021    BILT 0.5 08/05/2021    TP 6.2 08/05/2021    AST 98 08/05/2021    ALT 45 08/05/2021    PTT 33.4 08/05/2021    INR 1.41 08/05/2021    PTP 17.6 08/05/2021    TROP 9.370 08/05/2021     08/05/2021    PGLU 271 08/05/2021       Radiology: XR A ANGIO    Result Date: 8/5/2021  Christopher Ramos MD     8/5/2021  4:46 AM [address] Location: Cath Lab  [facility] [id number] MRN [id number] Admission Date 8/4/2021 Procedure Date 8/5/2021 Attending Physician Camilo Thomas MD Procedure P vessel with a 80% mid vessel stenosis and mild diffuse disease. It gives rise to a medium caliber obtuse marginal RCA:  The right coronary artery is a large caliber highly ectatic vessel with tandem 90% mid vessel stenoses and sluggish distal flow.  It give from  to 8530, for a total of 29 minutes, and an independent trained observer was present and assisted in the monitoring of the patient's level of consciousness and physiological status, watching the heart rate, blood pressure, oximetry, and rhythm, in catheterization lab. IMPRESSION:  1. Coronary angiography:  3 vessel coronary artery disease - LM: Mild disease - LAD: 99% proximal LAD stenosis - LCx: 80% mid vessel stenosis - RCA: Highly ectatic, tandem 90% stenoses 2.  IABP Placement Successful placem Intravenous contrast (Definity) was administered. ECG rhythm:   Normal sinus  Study completion:  There were no complications. ---------------------------------------------------------------------------- Conclusions: 1. Left ventricle:  The cavity size was n Structurally normal valve. Leaflet separation was normal.  Doppler:  Transvalvular velocity was within the normal range. There was no evidence for stenosis. There was no regurgitation. Pulmonic valve:    No significant valve disease.     Doppler:  Elmira Atwood --------- Legend: (L)  and  (H)  kanu values outside specified reference range. ---------------------------------------------------------------------------- Prepared and electronically signed by Jon Kapadia MD. 08/05/2021 14:42          ASSESSMENT / PLAN:

## 2021-08-05 NOTE — PROGRESS NOTES
[facility] Cardiology  Progress Note    Wyatt Damon Patient Status:  Inpatient    1955 MRN [id number]   Location [address] [place] [address] Attending Tiffani Marti MD   Hosp Day # 1 PCP Monique Perez MD     Reason clubbing/cyanosis; moves all 4 extremities normally    [MAR Hold] aspirin chewable tab 81 mg, 81 mg, Oral, Daily  [MAR Hold] morphINE sulfate (PF) 2 MG/ML injection 2 mg, 2 mg, Intravenous, Q2H PRN   Or  [MAR Hold] morphINE sulfate (PF) 4 MG/ML injection 4 PRN  [MAR Hold] bisacodyl (DULCOLAX) rectal suppository 10 mg, 10 mg, Rectal, Daily PRN  [MAR Hold] Fleet Enema (FLEET) 7-19 GM/118ML enema 133 mL, 1 enema, Rectal, Once PRN  [MAR Hold] ondansetron (ZOFRAN) injection 4 mg, 4 mg, Intravenous, Q6H PRN  [facility]

## 2021-08-05 NOTE — ANESTHESIA PROCEDURE NOTES
Central Line  Performed by: Juan A Brito MD  Authorized by: Juan A Brito MD     General Information and Staff    Procedure Start:  8/5/2021 10:18 AM  Procedure End:  8/5/2021 10:28 AM  Anesthesiologist:  Juan A Brito MD  Pe

## 2021-08-05 NOTE — ANESTHESIA PROCEDURE NOTES
Procedure Performed: PRADEEP      Start Time:  8/5/2021 10:45 AM       End Time:   8/5/2021 2:27 PM    Preanesthesia Checklist:  Patient identified, IV assessed, risks and benefits discussed, monitors and equipment assessed, procedure being performed at [address] [place] [address] normal.    Pulmonic Valve: Annulus normal.          Aorta    Ascending Aorta:  Size normal.  Dissection not present. Plaque thickness less than 3 mm. Mobile plaque not present. Aortic Arch:  Size normal.  Dissection not present.   Plaque thickness les

## 2021-08-05 NOTE — PROCEDURES
[address] Location: Cath Lab    CSN [id number] MRN [id number]   Admission Date 8/4/2021 Procedure Date 8/5/2021   Attending Physician Yane Walters MD Procedure Physician Mamie Mar MD         CARDIAC CATHETERIZATION/PER vessel stenosis and mild diffuse disease. It gives rise to a medium caliber obtuse marginal     RCA:  The right coronary artery is a large caliber highly ectatic vessel with tandem 90% mid vessel stenoses and sluggish distal flow.  It gives rise to the rPDA

## 2021-08-05 NOTE — ANESTHESIA PROCEDURE NOTES
Arterial Line  Performed by: Aliza May MD  Authorized by: Aliza May MD     General Information and Staff    Procedure Start:  8/5/2021 10:09 AM  Procedure End:  8/5/2021 10:14 AM  Anesthesiologist:  Aliza May MD

## 2021-08-05 NOTE — CONSULTS
Cardiothoracic Surgery  Heart Consult       Referring: Dr Shai Leonard  PCP: Dr Itz Mckeon  Reason for consult: severe CAD  72year old with h/o acute CP and SOB yesterday afternoon  Troponin this am 9.4  Cath:  1.   Coronary angiography:  3 vessel coronary artery dis soft  Neuro/Psych: awake and orientated x 3, appropriate mood and affect      Labs:  13>11.9<290  BUN/Cr 19/1.33  HbA1C 7.6  A/P:  72year old with severe 3 V CAD EF 45 % IABP placed, pt with continued CP  The chart, old records and the cardiac cath was re

## 2021-08-05 NOTE — DIETARY NOTE
Clinical Nutrition    Dietitian consult received per cardiac rehab standing order. Pt to be educated by cardiac rehab staff and encouraged to attend outpatient classes taught by RD. RD available PRN.      Kris Young MS, RD, LDN  Clinical Dietitian  Pag

## 2021-08-05 NOTE — PLAN OF CARE
Assumed care of pt @ 0730. Pt lying flat, ECHO being completed. IABP waveform flat as it has been since arrival from cath lab. Pt still complaining of chest pain on 80 of NTG. Morphine given for pain. Myers inserted. Consents signed and pt taken to [facility].  P

## 2021-08-06 ENCOUNTER — APPOINTMENT (OUTPATIENT)
Dept: GENERAL RADIOLOGY | Facility: HOSPITAL | Age: 66
DRG: 234 | End: 2021-08-06
Attending: PHYSICIAN ASSISTANT
Payer: MEDICARE

## 2021-08-06 LAB
ARTERIAL BLD GAS O2 SATURATION: 95 % (ref 92–100)
ARTERIAL BLD GAS O2 SATURATION: 96 % (ref 92–100)
ARTERIAL BLOOD GAS BASE EXCESS: -1.8 MMOL/L (ref ?–2)
ARTERIAL BLOOD GAS BASE EXCESS: -2.9 MMOL/L (ref ?–2)
ARTERIAL BLOOD GAS HCO3: 20.1 MEQ/L (ref 22–26)
ARTERIAL BLOOD GAS HCO3: 21.3 MEQ/L (ref 22–26)
ARTERIAL BLOOD GAS PCO2: 29 MM HG (ref 35–45)
ARTERIAL BLOOD GAS PCO2: 30 MM HG (ref 35–45)
ARTERIAL BLOOD GAS PH: 7.47 (ref 7.35–7.45)
ARTERIAL BLOOD GAS PH: 7.47 (ref 7.35–7.45)
ARTERIAL BLOOD GAS PO2: 81 MM HG (ref 80–105)
ARTERIAL BLOOD GAS PO2: 87 MM HG (ref 80–105)
BASOPHILS # BLD AUTO: 0.02 X10(3) UL (ref 0–0.2)
BASOPHILS NFR BLD AUTO: 0.1 %
BUN BLD-MCNC: 26 MG/DL (ref 7–18)
CALCIUM BLD-MCNC: 8.6 MG/DL (ref 8.5–10.1)
CALCULATED O2 SATURATION: 97 % (ref 92–100)
CALCULATED O2 SATURATION: 97 % (ref 92–100)
CARBOXYHEMOGLOBIN: 1 % SAT (ref 0–3)
CARBOXYHEMOGLOBIN: 1.1 % SAT (ref 0–3)
CHLORIDE SERPL-SCNC: 116 MMOL/L (ref 98–112)
CO2 SERPL-SCNC: 23 MMOL/L (ref 21–32)
CREAT BLD-MCNC: 1.94 MG/DL
EOSINOPHIL # BLD AUTO: 0 X10(3) UL (ref 0–0.7)
EOSINOPHIL NFR BLD AUTO: 0 %
ERYTHROCYTE [DISTWIDTH] IN BLOOD BY AUTOMATED COUNT: 14.6 %
FIO2: 40 %
FIO2: 40 %
GLUCOSE BLD-MCNC: 101 MG/DL (ref 70–99)
GLUCOSE BLD-MCNC: 106 MG/DL (ref 70–99)
GLUCOSE BLD-MCNC: 107 MG/DL (ref 70–99)
GLUCOSE BLD-MCNC: 108 MG/DL (ref 70–99)
GLUCOSE BLD-MCNC: 112 MG/DL (ref 70–99)
GLUCOSE BLD-MCNC: 113 MG/DL (ref 70–99)
GLUCOSE BLD-MCNC: 114 MG/DL (ref 70–99)
GLUCOSE BLD-MCNC: 115 MG/DL (ref 70–99)
GLUCOSE BLD-MCNC: 118 MG/DL (ref 70–99)
GLUCOSE BLD-MCNC: 119 MG/DL (ref 70–99)
GLUCOSE BLD-MCNC: 120 MG/DL (ref 70–99)
GLUCOSE BLD-MCNC: 123 MG/DL (ref 70–99)
GLUCOSE BLD-MCNC: 125 MG/DL (ref 70–99)
GLUCOSE BLD-MCNC: 127 MG/DL (ref 70–99)
GLUCOSE BLD-MCNC: 130 MG/DL (ref 70–99)
GLUCOSE BLD-MCNC: 135 MG/DL (ref 70–99)
GLUCOSE BLD-MCNC: 137 MG/DL (ref 70–99)
GLUCOSE BLD-MCNC: 138 MG/DL (ref 70–99)
GLUCOSE BLD-MCNC: 141 MG/DL (ref 70–99)
GLUCOSE BLD-MCNC: 145 MG/DL (ref 70–99)
GLUCOSE BLD-MCNC: 197 MG/DL (ref 70–99)
GLUCOSE BLD-MCNC: 199 MG/DL (ref 70–99)
GLUCOSE BLD-MCNC: 82 MG/DL (ref 70–99)
GLUCOSE BLD-MCNC: 84 MG/DL (ref 70–99)
HAV IGM SER QL: 1.4 MG/DL (ref 1.6–2.6)
HCT VFR BLD AUTO: 28.4 %
HGB BLD-MCNC: 9.7 G/DL
IMM GRANULOCYTES # BLD AUTO: 0.11 X10(3) UL (ref 0–1)
IMM GRANULOCYTES NFR BLD: 0.6 %
IONIZED CALCIUM: 1.22 MMOL/L (ref 1.12–1.32)
IONIZED CALCIUM: 1.28 MMOL/L (ref 1.12–1.32)
ISTAT ACTIVATED CLOTTING TIME: 466 SECONDS (ref 74–137)
ISTAT ACTIVATED CLOTTING TIME: 477 SECONDS (ref 74–137)
ISTAT BLOOD GAS BASE EXCESS: -2 MMOL/L (ref ?–30)
ISTAT BLOOD GAS BASE EXCESS: -3 MMOL/L (ref ?–30)
ISTAT BLOOD GAS HCO3: 24.3 MEQ/L (ref 22–26)
ISTAT BLOOD GAS HCO3: 24.8 MEQ/L (ref 22–26)
ISTAT BLOOD GAS O2 SATURATION: 100 % (ref 92–100)
ISTAT BLOOD GAS O2 SATURATION: 100 % (ref 92–100)
ISTAT BLOOD GAS PCO2: 43.5 MMHG (ref 35–45)
ISTAT BLOOD GAS PCO2: 44.6 MMHG (ref 35–45)
ISTAT BLOOD GAS PH: 7.32 (ref 7.35–7.45)
ISTAT BLOOD GAS PH: 7.34 (ref 7.35–7.45)
ISTAT BLOOD GAS PO2: 363 MMHG (ref 80–105)
ISTAT BLOOD GAS PO2: 367 MMHG (ref 80–105)
ISTAT BLOOD GAS TCO2: 26 MMOL/L (ref 22–32)
ISTAT BLOOD GAS TCO2: 26 MMOL/L (ref 22–32)
ISTAT HEMATOCRIT: 26 %
ISTAT HEMATOCRIT: 26 %
ISTAT IONIZED CALCIUM: 1.22 MMOL/L (ref 1.12–1.32)
ISTAT IONIZED CALCIUM: 1.23 MMOL/L (ref 1.12–1.32)
ISTAT PATIENT TEMPERATURE: 32 DEGREE
ISTAT PATIENT TEMPERATURE: 32 DEGREE
ISTAT POTASSIUM: 4.8 MMOL/L (ref 3.6–5.1)
ISTAT POTASSIUM: 4.8 MMOL/L (ref 3.6–5.1)
ISTAT SODIUM: 138 MMOL/L (ref 136–145)
ISTAT SODIUM: 139 MMOL/L (ref 136–145)
LACTIC ACID ARTERIAL: 1.7 MMOL/L (ref 0.5–2)
LACTIC ACID ARTERIAL: 2.4 MMOL/L (ref 0.5–2)
LYMPHOCYTES # BLD AUTO: 0.5 X10(3) UL (ref 1–4)
LYMPHOCYTES NFR BLD AUTO: 2.8 %
MCH RBC QN AUTO: 30.2 PG (ref 26–34)
MCHC RBC AUTO-ENTMCNC: 34.2 G/DL (ref 31–37)
MCV RBC AUTO: 88.5 FL
METHEMOGLOBIN: 0.6 % SAT (ref 0.4–1.5)
METHEMOGLOBIN: 1.6 % SAT (ref 0.4–1.5)
MONOCYTES # BLD AUTO: 0.83 X10(3) UL (ref 0.1–1)
MONOCYTES NFR BLD AUTO: 4.7 %
NEUTROPHILS # BLD AUTO: 16.09 X10 (3) UL (ref 1.5–7.7)
NEUTROPHILS # BLD AUTO: 16.09 X10(3) UL (ref 1.5–7.7)
NEUTROPHILS NFR BLD AUTO: 91.8 %
NITRIC OXIDE: 40 PPM
P/F RATIO: 205.1 MMHG
P/F RATIO: 212.8 MMHG
PATIENT TEMPERATURE: 98.3 F
PATIENT TEMPERATURE: 99.2 F
PEEP: 5 CM H2O
PEEP: 5 CM H2O
PLATELET # BLD AUTO: 161 10(3)UL (ref 150–450)
POTASSIUM BLOOD GAS: 4 MMOL/L (ref 3.6–5.1)
POTASSIUM BLOOD GAS: 4.6 MMOL/L (ref 3.6–5.1)
POTASSIUM SERPL-SCNC: 4.2 MMOL/L (ref 3.5–5.1)
PRESSURE SUPPORT: 5 CM H2O
RBC # BLD AUTO: 3.21 X10(6)UL
SODIUM BLOOD GAS: 142 MMOL/L (ref 136–144)
SODIUM BLOOD GAS: 143 MMOL/L (ref 136–144)
SODIUM SERPL-SCNC: 145 MMOL/L (ref 136–145)
TIDAL VOLUME: 600 ML
TOTAL HEMOGLOBIN: 8.4 G/DL
TOTAL HEMOGLOBIN: 9.5 G/DL
VENT RATE: 16 /MIN
WBC # BLD AUTO: 17.6 X10(3) UL (ref 4–11)

## 2021-08-06 PROCEDURE — C9113 INJ PANTOPRAZOLE SODIUM, VIA: HCPCS | Performed by: PHYSICIAN ASSISTANT

## 2021-08-06 PROCEDURE — 82962 GLUCOSE BLOOD TEST: CPT

## 2021-08-06 PROCEDURE — 80048 BASIC METABOLIC PNL TOTAL CA: CPT | Performed by: PHYSICIAN ASSISTANT

## 2021-08-06 PROCEDURE — 82803 BLOOD GASES ANY COMBINATION: CPT | Performed by: ANESTHESIOLOGY

## 2021-08-06 PROCEDURE — 82330 ASSAY OF CALCIUM: CPT | Performed by: ANESTHESIOLOGY

## 2021-08-06 PROCEDURE — 82375 ASSAY CARBOXYHB QUANT: CPT | Performed by: ANESTHESIOLOGY

## 2021-08-06 PROCEDURE — 83050 HGB METHEMOGLOBIN QUAN: CPT | Performed by: ANESTHESIOLOGY

## 2021-08-06 PROCEDURE — 83605 ASSAY OF LACTIC ACID: CPT | Performed by: ANESTHESIOLOGY

## 2021-08-06 PROCEDURE — 71045 X-RAY EXAM CHEST 1 VIEW: CPT | Performed by: PHYSICIAN ASSISTANT

## 2021-08-06 PROCEDURE — 84132 ASSAY OF SERUM POTASSIUM: CPT | Performed by: ANESTHESIOLOGY

## 2021-08-06 PROCEDURE — 83735 ASSAY OF MAGNESIUM: CPT | Performed by: PHYSICIAN ASSISTANT

## 2021-08-06 PROCEDURE — 84295 ASSAY OF SERUM SODIUM: CPT | Performed by: ANESTHESIOLOGY

## 2021-08-06 PROCEDURE — 93005 ELECTROCARDIOGRAM TRACING: CPT

## 2021-08-06 PROCEDURE — 85025 COMPLETE CBC W/AUTO DIFF WBC: CPT | Performed by: PHYSICIAN ASSISTANT

## 2021-08-06 PROCEDURE — 93010 ELECTROCARDIOGRAM REPORT: CPT | Performed by: INTERNAL MEDICINE

## 2021-08-06 PROCEDURE — 85018 HEMOGLOBIN: CPT | Performed by: ANESTHESIOLOGY

## 2021-08-06 PROCEDURE — 94150 VITAL CAPACITY TEST: CPT

## 2021-08-06 PROCEDURE — 94003 VENT MGMT INPAT SUBQ DAY: CPT

## 2021-08-06 RX ORDER — DEXMEDETOMIDINE HYDROCHLORIDE 4 UG/ML
INJECTION, SOLUTION INTRAVENOUS CONTINUOUS
Status: DISCONTINUED | OUTPATIENT
Start: 2021-08-06 | End: 2021-08-06

## 2021-08-06 RX ORDER — ASPIRIN 325 MG
325 TABLET ORAL ONCE
Status: COMPLETED | OUTPATIENT
Start: 2021-08-06 | End: 2021-08-06

## 2021-08-06 NOTE — PROGRESS NOTES
[facility] Cardiology  Progress Note    Nadinemily Parkinsonlottie Patient Status:  Inpatient    1955 MRN [id number]   Location [address] [place] [address] Attending Susan Ramirez MD   Hosp Day # 2 PCP Mehrdad Rudolph MD     Reason in sodium chloride 0.9% 100 mL infusion, 0.2-1.5 mcg/kg/hr (Dosing Weight), Intravenous, Continuous PRN  Midazolam HCl (VERSED) 2 MG/2ML injection 1 mg, 1 mg, Intravenous, Q30 Min PRN  Chlorhexidine Gluconate (PERIDEX) 0.12 % solution 15 mL, 15 mL, Mouth/T Intravenous, PRN  calcium gluconate 3 g in sodium chloride 0.9% 100 mL IVPB, 3 g, Intravenous, PRN  Magnesium Sulfate in D5W IVPB premix 1 g, 1 g, Intravenous, PRN  Magnesium Sulfate IVPB premix SOLN 2 g, 2 g, Intravenous, PRN  mupirocin (BACTROBAN) 2% tammi Telemetry: No malignant tachyarrhythmias or bradyarrhythmias      Thank you for allowing our practice to participate in the care of your patient. Please do not hesitate to contact me if you have any questions.     Bibiana Arriaga MD

## 2021-08-06 NOTE — PLAN OF CARE
Pt received vented, sedated on Precedex. Pt woke up very anxious and restless, fighting vent. 's. Pt did ALLAN's, follow commands and nod appropriately to questions asked. 1 mcg of Versed and Morphine given. Propofol started eventually for pt comfort.

## 2021-08-06 NOTE — PROGRESS NOTES
[facility]     CV Surgery Progress Note    Valery Cardoson Patient Status:  Inpatient    1955 MRN [id number]   [facility] 6NE-A Attending Sindi Herron MD   Hosp Day # 2 PCP Janak Abbasi MD     Subjective:  Patient intub otherwise unchanged. Sternal wires are midline.  Chest wall structures are otherwise unremarkable. Physical Exam:  General: VSS, intubated/sedated, In NAD  Neck: No JVD. Coxsackie/Cordis in [address].   Lungs: mechanical breath sounds, clear anteriorly   Heart: S1

## 2021-08-06 NOTE — RESPIRATORY THERAPY NOTE
Received patient on VC+ 16/600/50%/+5 w/ NO 40ppm.  FiO2 weaned overnight to 40%; no other changes made. Repeat ABG in AM.  Wean NO per anesthesia, as tolerated. Will continue to monitor closely.

## 2021-08-06 NOTE — CM/SW NOTE
73 yo sp urgent CABG. CM acknowledging post op protocol order. Home health referrals have been sent however pt was still intubated earlier today so not able to discuss with pt. Await PT eval when able. SW/CM team to f/u.   Home health liaison also a

## 2021-08-06 NOTE — OPERATIVE REPORT
[facility]    PATIENT'S NAME: Amandeep See   ATTENDING PHYSICIAN: Rosio Buchanan. Angela Mcdonald M.D.    OPERATING PHYSICIAN: Luis Wilder MD   PATIENT ACCOUNT#:   [id number]    LOCATION:  [address] [place] [address]  MEDICAL RECORD #:   [id number]       DATE OF BIRTH:  1 catheters were placed by Dr. Sunita Spencer. General anesthesia was induced. [place] was placed. Transesophageal echo was placed. Transcranial oximetry was placed. Balloon pump and Myers had previously been placed.   PA pressures were moderately elevated ultimately, to sinus rhythm. Balloon pump was restarted at 1:1.   Following complete and thorough rewarming with low-dose inotropic support in the form of low-dose epinephrine and low-dose dobutamine, the patient weaned from cardiopulmonary bypass without

## 2021-08-06 NOTE — PROGRESS NOTES
[facility] Hospitalist Progress Note     [facility]      SUBJECTIVE:  Remains intubated this morning  IABP take out this AM    OBJECTIVE:  Temp:  [97 °F (36.1 °C)-101.1 °F (38.4 °C)] 99.7 °F (37.6 °C)  Pulse:  [110-130] 112  Resp:  [10-31] 21  BP: () redemonstrated within the left lower renal pole, the larger measuring 10 mm in maximal radiographic diameter; otherwise, no discrete opaque ureteral calculi are radiographically detected.   The bowel gas radiographic pattern appears grossly normal on this s PORTABLE  (CPT=71045)    Result Date: 8/5/2021  PROCEDURE:  XR CHEST AP PORTABLE  (CPT=71045)  TECHNIQUE:  AP chest radiograph was obtained.   COMPARISON:  AMAN , XR, XR CHEST AP PORTABLE  (CPT=71045), 8/04/2021, 2:24 PM.  INDICATIONS:  s/p surgery  PATIE 5:22 PM       XR CHEST AP PORTABLE  (CPT=71045)    Result Date: 8/4/2021  PROCEDURE:  XR CHEST AP PORTABLE  (CPT=71045)  TECHNIQUE:  AP chest radiograph was obtained.   COMPARISON:  [facility] , XR, XR CHEST PA + LAT CHEST (CPT=71046), 1/09/2019, 10:25 AM.  IND ACCESS/CATHETER PLACEMENT:   The groin was prepped and draped in a sterile manner, and the right groin area was anesthetized with 2% lidocaine area. The right femoral artery was accessed with a micropuncture needle, and a 6-Congolese 11 cm sheath was placed. -Heparin infusion -ASA 81mg qday -nitroglycerin, titrate to chest pain -Statin -Cardiac surgery consultation.  I have discussed with on call surgeon -PRN morphine for refractory chest pain    CATH ANGIO    Result Date: 8/5/2021  Alba Farias MD     8/5/202 caliber vessel with a 99% proximal stenosis and mild diffuse disease.  It gives rise to a large caliber diagonal branch which has mild diffuse disease LCx: The left circumflex artery is a large caliber vessel with a 80% mid vessel stenosis and mild diffuse Transthoracic Echocardiogram Name:Schaffer, Sherida Habermann Date: 2021 :  1955 Ht:  (71in)  BP: 111 / 63 MRN:  [id number]    Age:  65years    Wt:  (308lb) HR: 103bpm Loc:  [facility]        Gndr: M          BSA: 2.54m^2 Sonographer: Ordering:    Juan Parsons MD ejection fraction was 35-40%. Mid-distal anterior, inferior and apical akinesis. Regional wall motion abnormalities:  Akinesis of the apical anterior, mid anteroseptal, apical inferior, apical septal, and apical myocardium.  Doppler parameters are consiste shortening, PLAX              25    %      25 - 43  LV PW thickness, ED, PLAX           0.9   cm     0.6 - 1.0  IVS/LV PW ratio, ED, PLAX           1.02         ---------  LV ejection fraction        (L)     40    %      >=55   Ventricular septum MG/ML injection 6 mg, 6 mg, Intravenous, Q2H PRN  melatonin tab 3 mg, 3 mg, Oral, Nightly PRN  docusate sodium (COLACE) cap 100 mg, 100 mg, Oral, BID  PEG 3350 (MIRALAX) powder packet 17 g, 17 g, Oral, Daily PRN  magnesium hydroxide (MILK OF MAGNESIA) 400  MG per tab 2 tablet, 2 tablet, Oral, Q4H PRN  acetaminophen (OFIRMEV) infusion 1,000 mg, 1,000 mg, Intravenous, Q6H PRN  Pantoprazole Sodium (PROTONIX) 40 mg in Sodium Chloride (PF) 0.9 % 10 mL IV push, 40 mg, Intravenous, QAM AC   Or  pantoprazole

## 2021-08-06 NOTE — PHYSICAL THERAPY NOTE
Physical Therapy    Orders for PT eval received. Pt is s/p CABG on 8/5. Pt currently remains intubated. Will initiate eval once medically stable to participate and as schedule allows.

## 2021-08-06 NOTE — OCCUPATIONAL THERAPY NOTE
Received order for OT evaluation. Intubated. Therapy will initiate evaluation once the pt is medically stable. Spoke with RN.

## 2021-08-06 NOTE — PROGRESS NOTES
08/05/21 1836   Vent Information   $ RT Standby Charge (per 15 min) 1   Ventilator Initiation 08/05/21   Ventilation Day(s) 1   Interface Invasive   Vent Type PB   Vent -   Vent Mode VC+   Settings   FiO2 (%) 50 %   Resp Rate (Set) 16   Vt (Set, m

## 2021-08-06 NOTE — PLAN OF CARE
Assumed care of pt @ 0730. Pt intubated and sedated. Balloon pump pulled at 0830. No complications. Femstop in place for 1 hr. Pt flat for 4 hours. Groin site soft, no bleeding no hematoma. Sedation weaned and patient extubated in the afternoon.  Tolerated

## 2021-08-07 ENCOUNTER — APPOINTMENT (OUTPATIENT)
Dept: GENERAL RADIOLOGY | Facility: HOSPITAL | Age: 66
DRG: 234 | End: 2021-08-07
Attending: THORACIC SURGERY (CARDIOTHORACIC VASCULAR SURGERY)
Payer: MEDICARE

## 2021-08-07 ENCOUNTER — APPOINTMENT (OUTPATIENT)
Dept: GENERAL RADIOLOGY | Facility: HOSPITAL | Age: 66
DRG: 234 | End: 2021-08-07
Attending: PHYSICIAN ASSISTANT
Payer: MEDICARE

## 2021-08-07 LAB
ANION GAP SERPL CALC-SCNC: 6 MMOL/L (ref 0–18)
ATRIAL RATE: 114 BPM
ATRIAL RATE: 59 BPM
BUN BLD-MCNC: 36 MG/DL (ref 7–18)
CALCIUM BLD-MCNC: 8.1 MG/DL (ref 8.5–10.1)
CHLORIDE SERPL-SCNC: 114 MMOL/L (ref 98–112)
CO2 SERPL-SCNC: 23 MMOL/L (ref 21–32)
CREAT BLD-MCNC: 2.08 MG/DL
ERYTHROCYTE [DISTWIDTH] IN BLOOD BY AUTOMATED COUNT: 15.6 %
GLUCOSE BLD-MCNC: 111 MG/DL (ref 70–99)
GLUCOSE BLD-MCNC: 113 MG/DL (ref 70–99)
GLUCOSE BLD-MCNC: 115 MG/DL (ref 70–99)
GLUCOSE BLD-MCNC: 117 MG/DL (ref 70–99)
GLUCOSE BLD-MCNC: 118 MG/DL (ref 70–99)
GLUCOSE BLD-MCNC: 121 MG/DL (ref 70–99)
GLUCOSE BLD-MCNC: 123 MG/DL (ref 70–99)
GLUCOSE BLD-MCNC: 126 MG/DL (ref 70–99)
GLUCOSE BLD-MCNC: 127 MG/DL (ref 70–99)
GLUCOSE BLD-MCNC: 131 MG/DL (ref 70–99)
GLUCOSE BLD-MCNC: 151 MG/DL (ref 70–99)
GLUCOSE BLD-MCNC: 250 MG/DL (ref 70–99)
GLUCOSE BLD-MCNC: 272 MG/DL (ref 70–99)
HCT VFR BLD AUTO: 26.9 %
HGB BLD-MCNC: 8.8 G/DL
MCH RBC QN AUTO: 30.2 PG (ref 26–34)
MCHC RBC AUTO-ENTMCNC: 32.7 G/DL (ref 31–37)
MCV RBC AUTO: 92.4 FL
OSMOLALITY SERPL CALC.SUM OF ELEC: 305 MOSM/KG (ref 275–295)
P AXIS: 40 DEGREES
P-R INTERVAL: 172 MS
PLATELET # BLD AUTO: 192 10(3)UL (ref 150–450)
POTASSIUM SERPL-SCNC: 4 MMOL/L (ref 3.5–5.1)
Q-T INTERVAL: 344 MS
Q-T INTERVAL: 452 MS
QRS DURATION: 136 MS
QRS DURATION: 140 MS
QTC CALCULATION (BEZET): 474 MS
QTC CALCULATION (BEZET): 630 MS
R AXIS: 111 DEGREES
R AXIS: 122 DEGREES
RBC # BLD AUTO: 2.91 X10(6)UL
SODIUM SERPL-SCNC: 143 MMOL/L (ref 136–145)
T AXIS: 50 DEGREES
T AXIS: 50 DEGREES
VENTRICULAR RATE: 114 BPM
VENTRICULAR RATE: 117 BPM
WBC # BLD AUTO: 19.9 X10(3) UL (ref 4–11)

## 2021-08-07 PROCEDURE — 71045 X-RAY EXAM CHEST 1 VIEW: CPT | Performed by: PHYSICIAN ASSISTANT

## 2021-08-07 PROCEDURE — 80048 BASIC METABOLIC PNL TOTAL CA: CPT | Performed by: PHYSICIAN ASSISTANT

## 2021-08-07 PROCEDURE — 97162 PT EVAL MOD COMPLEX 30 MIN: CPT

## 2021-08-07 PROCEDURE — 97116 GAIT TRAINING THERAPY: CPT

## 2021-08-07 PROCEDURE — 85027 COMPLETE CBC AUTOMATED: CPT | Performed by: PHYSICIAN ASSISTANT

## 2021-08-07 PROCEDURE — 71045 X-RAY EXAM CHEST 1 VIEW: CPT | Performed by: THORACIC SURGERY (CARDIOTHORACIC VASCULAR SURGERY)

## 2021-08-07 PROCEDURE — 82962 GLUCOSE BLOOD TEST: CPT

## 2021-08-07 RX ORDER — FUROSEMIDE 10 MG/ML
40 INJECTION INTRAMUSCULAR; INTRAVENOUS
Status: DISCONTINUED | OUTPATIENT
Start: 2021-08-07 | End: 2021-08-09

## 2021-08-07 RX ORDER — HEPARIN SODIUM 5000 [USP'U]/ML
5000 INJECTION, SOLUTION INTRAVENOUS; SUBCUTANEOUS EVERY 12 HOURS SCHEDULED
Status: DISCONTINUED | OUTPATIENT
Start: 2021-08-07 | End: 2021-08-11

## 2021-08-07 NOTE — PHYSICAL THERAPY NOTE
PHYSICAL THERAPY EVALUATION - INPATIENT     Room Number: 5717/0059-M  Evaluation Date: 8/7/2021  Type of Evaluation: Initial  Physician Order: PT Eval and Treat    Presenting Problem: chest pain, angina  Reason for Therapy: Mobility Dysfunction and D POLYPECTOMY 54821;  Surgeon: Liv Ricardo MD;  Location: [address] [place] [address]   • EGD N/A 9/10/2020    Procedure: ESOPHAGOGASTRODUODENOSCOPY, COLONOSCOPY, POSSIBLE BIOPSY, POSSIBLE POLYPECTOMY 43582, 65230;  Surgeon: Main Hernandez MD;  Location: [facility] return home    OBJECTIVE  Precautions: Sternal;Cardiac;Bed/chair alarm (gibbs catheter)  Fall Risk: Standard fall risk    WEIGHT BEARING RESTRICTION  Weight Bearing Restriction: None                PAIN ASSESSMENT  Ratin          COGNITION  · Overall C chair. Patient performed sit->stand transfers cga with cues provided for safety/technique/sternal precautions, patient ambulated with the RW to the bathroom.   Patient was instructed to call staff when ready to get off the toilet, patient verbalized unders mobility; Patient education;Gait training;Range of motion;Strengthening;Transfer training;Stair training  Rehab Potential : Good  Frequency (Obs): 3-5x/week  Number of Visits to Meet Established Goals: 2      CURRENT GOALS    Goal #1 Patient is able to demo

## 2021-08-07 NOTE — PLAN OF CARE
Assumed care of pt at 0730. Pt up to chair for 1.5 hours then assisted back to bed. Dr Claudeen Marker here to see pt. Orders received for 40mg IV lasix BID and to deline pt including Chest tubes, A-Line, [place], Cordis and Myers catheter.  Pt delined and post chest tube

## 2021-08-07 NOTE — HOME CARE LIAISON
[facility] received referral for post discharge care. Patient met at bedside to discuss home health post discharge per Dr. Nichole Vicente protocol. At this time patient is declining home health. SW/CM notified. [facility] will follow-up as needed.

## 2021-08-07 NOTE — PROGRESS NOTES
[facility]   [facility] Progress Note    [facility] Patient Status:  Inpatient    1955 MRN [id number]   [facility] 6NE-A Attending Brad Lugo MD   Hosp Day # 3 PCP Fili Murphy MD     Subjective: Patient seen and examined sulfate (PF) 2 MG/ML injection 2 mg, 2 mg, Intravenous, Q2H PRN   Or  morphINE sulfate (PF) 4 MG/ML injection 4 mg, 4 mg, Intravenous, Q2H PRN   Or  morphINE sulfate (PF) 4 MG/ML injection 6 mg, 6 mg, Intravenous, Q2H PRN  melatonin tab 3 mg, 3 mg, Oral, N Or  HYDROcodone-acetaminophen (NORCO)  MG per tab 2 tablet, 2 tablet, Oral, Q4H PRN  Pantoprazole Sodium (PROTONIX) 40 mg in Sodium Chloride (PF) 0.9 % 10 mL IV push, 40 mg, Intravenous, QAM AC   Or  pantoprazole (PROTONIX) EC tab 40 mg, 40 mg, Oral, insulin (HCC)     Hypertriglyceridemia     Nephrolithiasis     History of bladder cancer     Acute renal insufficiency     Acute CVA (cerebrovascular accident) ([facility]     Expressive aphasia     DM type 2 with diabetic mixed hyperlipidemia ([address]     Hypercal

## 2021-08-07 NOTE — PROGRESS NOTES
[facility] Hospitalist Progress Note     [facility]      SUBJECTIVE:  Awake and alert this AM  Ate breakfast    OBJECTIVE:  Temp:  [97.7 °F (36.5 °C)-100.2 °F (37.9 °C)] 98.7 °F (37.1 °C)  Pulse:  [] 106  Resp:  [10-28] 13  BP: (104-148)/(56-91) 135/ 8/4/2021  DATE OF SERVICE: [phone] KUB, 8/3/2021. COMPARISON STUDIES: KUB, 2/1/2021. CLINICAL HISTORY: Nephrolithiasis.  FINDINGS: Two adjacent calcified intra-articular calculi are redemonstrated within the left lower renal pole, the larger measuring 10 perihilar lung and left lower lung.     Dictated by (CST): Arnoldo Ahn MD on 8/06/2021 at 6:06 AM     Finalized by (CST): Arnoldo Ahn MD on 8/06/2021 at 6:07 AM       XR CHEST AP PORTABLE  (CPT=71045)    Result Date: 8/5/2021  PROCEDURE:  XR CHEST AP PO be advanced approximately 1 cm for more adequate positioning.      Dictated by (CST): Satya Cox DO on 8/05/2021 at 5:19 PM     Finalized by (CST): Satya Cox DO on 8/05/2021 at 5:22 PM       XR CHEST AP PORTABLE  (CPT=71045)    Result Date: 8/4/2021 of the patient's level of consciousness and physiological status, watching the heart rate, blood pressure, oximetry, and rhythm, in addition to total moderation time.   ACCESS/CATHETER PLACEMENT:   The groin was prepped and draped in a sterile manner, and t proximal LAD stenosis - LCx: 80% mid vessel stenosis - RCA: Highly ectatic, tandem 90% stenoses 2.  IABP Placement Successful placement of 40cc IABP  RECOMMENDATIONS: -Heparin infusion -ASA 81mg qday -nitroglycerin, titrate to chest pain -Statin -Cardiac boothe angiography:  Left main artery: The left main artery is a large caliber vessel with mild luminal irregularities. LAD:  The left anterior descending artery is a large caliber vessel with a 99% proximal stenosis and mild diffuse disease.  It gives rise to a l [address]007                                                               [phone] ---------------------------------------------------------------------------- Transthoracic Echocardiogram Name:Destiny Maurer Date: 2021 :  1955 Ht:  (71in) ---------------------------------------------------------------------------- * Left ventricle: The cavity size was normal. Wall thickness was normal. Systolic function was moderately reduced. The estimated ejection fraction was 35-40%.  Mid-distal anterior ---------------------------------------------------------------------------- Measurements  Left ventricle                      Value        Reference  LV ID, ED, PLAX                     5.3   cm     4.2 - 5.9  LV ID, ES, PLAX                     3.9   cm 0.2-1.5 mcg/kg/hr (Dosing Weight), Intravenous, Continuous PRN  Midazolam HCl (VERSED) 2 MG/2ML injection 1 mg, 1 mg, Intravenous, Q30 Min PRN  ipratropium-albuterol (DUONEB) nebulizer solution 3 mL, 3 mL, Nebulization, Q4H PRN  morphINE sulfate (PF) 2 MG/ premix SOLN 2 g, 2 g, Intravenous, PRN  mupirocin (BACTROBAN) 2% nasal ointment OINT 1 Application, 1 Application, Nasal, BID  HYDROcodone-acetaminophen (NORCO)  MG per tab 1 tablet, 1 tablet, Oral, Q4H PRN   Or  HYDROcodone-acetaminophen (NORCO) 10-

## 2021-08-07 NOTE — PLAN OF CARE
Patient care assumed 1900, extubated, resting moderately comfortably; having some sternal pain but having difficulty in rating severity; using \"mild, moderate, or severe\" to rate pain. CPAP at night, insulin gtt overnight until eating PO.  Plan for chair

## 2021-08-07 NOTE — PROGRESS NOTES
[facility] Cardiology Progress Note    Cat Luna Patient Status:  Inpatient    1955 MRN [id number]   [facility] 6NE-A Attending Aubrey Anderson MD   Hosp Day # 3 PCP Martín Escudero MD     Outpatient cardiologist:  Blake Hyatt Reviewed, Patient Examined, APN Note Reviewed and Agree. Stable Post CABG.   Debra Klein MD    Medications:  • Chlorhexidine Gluconate  15 mL Mouth/Throat [email or web address]   • docusate sodium  100 mg Oral BID   • aspirin EC  325 mg Oral Daily    Or   • aspir pedal pulses 2+  Neurologic: no focal deficits  Skin: Warm and dry. Telemetry: No malignant tachyarrhythmias or bradyarrhythmias    EKG:  NSR/ ST 100s    Echo: 8-5-21  Conclusions:     1. Left ventricle:  The cavity size was normal. Wall thickness was n 08/05/21  0616   TROP 1.340* 9.750* 9.370*   CK  --   --  490*       Recent Labs   Lab 08/04/21  1537 08/05/21  1417 08/05/21  1601   PTP 13.3 17.6* 16.3*   INR 0.99 1.41* 1.28*         Keerthi Rizo NP  8/7/2021  7:32 AM

## 2021-08-08 LAB
ANION GAP SERPL CALC-SCNC: 5 MMOL/L (ref 0–18)
ANION GAP SERPL CALC-SCNC: 9 MMOL/L (ref 0–18)
BLOOD TYPE BARCODE: 6200
BUN BLD-MCNC: 38 MG/DL (ref 7–18)
BUN BLD-MCNC: 47 MG/DL (ref 7–18)
CALCIUM BLD-MCNC: 8.3 MG/DL (ref 8.5–10.1)
CALCIUM BLD-MCNC: 8.6 MG/DL (ref 8.5–10.1)
CHLORIDE SERPL-SCNC: 106 MMOL/L (ref 98–112)
CHLORIDE SERPL-SCNC: 108 MMOL/L (ref 98–112)
CO2 SERPL-SCNC: 21 MMOL/L (ref 21–32)
CO2 SERPL-SCNC: 27 MMOL/L (ref 21–32)
CREAT BLD-MCNC: 2.03 MG/DL
CREAT BLD-MCNC: 2.15 MG/DL
ERYTHROCYTE [DISTWIDTH] IN BLOOD BY AUTOMATED COUNT: 15.7 %
GLUCOSE BLD-MCNC: 149 MG/DL (ref 70–99)
GLUCOSE BLD-MCNC: 183 MG/DL (ref 70–99)
GLUCOSE BLD-MCNC: 195 MG/DL (ref 70–99)
GLUCOSE BLD-MCNC: 206 MG/DL (ref 70–99)
GLUCOSE BLD-MCNC: 227 MG/DL (ref 70–99)
GLUCOSE BLD-MCNC: 231 MG/DL (ref 70–99)
HAV IGM SER QL: 2 MG/DL (ref 1.6–2.6)
HAV IGM SER QL: 2.1 MG/DL (ref 1.6–2.6)
HAV IGM SER QL: 2.2 MG/DL (ref 1.6–2.6)
HCT VFR BLD AUTO: 28.3 %
HGB BLD-MCNC: 9 G/DL
MCH RBC QN AUTO: 29.7 PG (ref 26–34)
MCHC RBC AUTO-ENTMCNC: 31.8 G/DL (ref 31–37)
MCV RBC AUTO: 93.4 FL
OSMOLALITY SERPL CALC.SUM OF ELEC: 300 MOSM/KG (ref 275–295)
OSMOLALITY SERPL CALC.SUM OF ELEC: 301 MOSM/KG (ref 275–295)
PLATELET # BLD AUTO: 224 10(3)UL (ref 150–450)
POTASSIUM SERPL-SCNC: 3.6 MMOL/L (ref 3.5–5.1)
POTASSIUM SERPL-SCNC: 3.8 MMOL/L (ref 3.5–5.1)
POTASSIUM SERPL-SCNC: 3.9 MMOL/L (ref 3.5–5.1)
RBC # BLD AUTO: 3.03 X10(6)UL
SODIUM SERPL-SCNC: 138 MMOL/L (ref 136–145)
SODIUM SERPL-SCNC: 138 MMOL/L (ref 136–145)
WBC # BLD AUTO: 14.2 X10(3) UL (ref 4–11)

## 2021-08-08 PROCEDURE — 93005 ELECTROCARDIOGRAM TRACING: CPT

## 2021-08-08 PROCEDURE — 83735 ASSAY OF MAGNESIUM: CPT | Performed by: INTERNAL MEDICINE

## 2021-08-08 PROCEDURE — 80048 BASIC METABOLIC PNL TOTAL CA: CPT | Performed by: PHYSICIAN ASSISTANT

## 2021-08-08 PROCEDURE — 84132 ASSAY OF SERUM POTASSIUM: CPT | Performed by: INTERNAL MEDICINE

## 2021-08-08 PROCEDURE — 93010 ELECTROCARDIOGRAM REPORT: CPT | Performed by: INTERNAL MEDICINE

## 2021-08-08 PROCEDURE — 82962 GLUCOSE BLOOD TEST: CPT

## 2021-08-08 PROCEDURE — 97165 OT EVAL LOW COMPLEX 30 MIN: CPT

## 2021-08-08 PROCEDURE — C9113 INJ PANTOPRAZOLE SODIUM, VIA: HCPCS | Performed by: PHYSICIAN ASSISTANT

## 2021-08-08 PROCEDURE — 97535 SELF CARE MNGMENT TRAINING: CPT

## 2021-08-08 PROCEDURE — 85027 COMPLETE CBC AUTOMATED: CPT | Performed by: PHYSICIAN ASSISTANT

## 2021-08-08 PROCEDURE — 80048 BASIC METABOLIC PNL TOTAL CA: CPT | Performed by: INTERNAL MEDICINE

## 2021-08-08 RX ORDER — POTASSIUM CHLORIDE 20 MEQ/1
20 TABLET, EXTENDED RELEASE ORAL ONCE
Status: COMPLETED | OUTPATIENT
Start: 2021-08-08 | End: 2021-08-08

## 2021-08-08 RX ORDER — METOPROLOL TARTRATE 5 MG/5ML
INJECTION INTRAVENOUS
Status: COMPLETED
Start: 2021-08-08 | End: 2021-08-08

## 2021-08-08 RX ORDER — METOPROLOL TARTRATE 5 MG/5ML
5 INJECTION INTRAVENOUS ONCE
Status: COMPLETED | OUTPATIENT
Start: 2021-08-08 | End: 2021-08-08

## 2021-08-08 RX ORDER — METOPROLOL TARTRATE 50 MG/1
50 TABLET, FILM COATED ORAL
Status: DISCONTINUED | OUTPATIENT
Start: 2021-08-09 | End: 2021-08-09

## 2021-08-08 NOTE — PROGRESS NOTES
[facility]   [facility] Progress Note    Sigifredo Laughlin Patient Status:  Inpatient    1955 MRN [id number]   [facility] 6NE-A Attending Sandra Castillo MD   Hosp Day # 4 PCP Tera Simon MD     Subjective: Patient seen and examined nebulizer solution 3 mL, 3 mL, Nebulization, Q4H PRN  morphINE sulfate (PF) 2 MG/ML injection 2 mg, 2 mg, Intravenous, Q2H PRN   Or  morphINE sulfate (PF) 4 MG/ML injection 4 mg, 4 mg, Intravenous, Q2H PRN   Or  morphINE sulfate (PF) 4 MG/ML injection 6 mg 1 tablet, 1 tablet, Oral, Q4H PRN   Or  HYDROcodone-acetaminophen (NORCO)  MG per tab 2 tablet, 2 tablet, Oral, Q4H PRN  Pantoprazole Sodium (PROTONIX) 40 mg in Sodium Chloride (PF) 0.9 % 10 mL IV push, 40 mg, Intravenous, QAM AC   Or  pantoprazole ( Hypertriglyceridemia     Nephrolithiasis     History of bladder cancer     Acute renal insufficiency     Acute CVA (cerebrovascular accident) ([facility] [address]     Expressive aphasia     DM type 2 with diabetic mixed hyperlipidemia (HCC)     Hypercalcemia     Hypercal

## 2021-08-08 NOTE — PROGRESS NOTES
CESILIAG Hospitalist Progress Note     [facility]      SUBJECTIVE:  No acute events  Ate Lao toast for breakfast  +flatus    OBJECTIVE:  Temp:  [98 °F (36.7 °C)-98.8 °F (37.1 °C)] 98 °F (36.7 °C)  Pulse:  [] 97  Resp:  [13-23] 17  BP: (119-183)/ (KIL=[id number])    Result Date: 8/4/2021  DATE OF SERVICE: 08.03.2021 KUB, 8/3/2021. COMPARISON STUDIES: KUB, 2/1/2021. CLINICAL HISTORY: Nephrolithiasis.  FINDINGS: Two adjacent calcified intra-articular calculi are redemonstrated within the left lower renal p improvement in atelectasis in right perihilar lung and left lower lung.     Dictated by (CST): Zhane De Leon MD on 8/06/2021 at 6:06 AM     Finalized by (CST): Zhane De Leon MD on 8/06/2021 at 6:07 AM       XR CHEST AP PORTABLE  (CPT=71045)    Result Date: be slightly high-riding and should be advanced approximately 1 cm for more adequate positioning.      Dictated by (CST): Dominique Reyna, DO on 8/05/2021 at 5:19 PM     Finalized by (CST): Dominique Reyna, DO on 8/05/2021 at 5:22 PM       XR CHEST AP PORTABLE  (C present and assisted in the monitoring of the patient's level of consciousness and physiological status, watching the heart rate, blood pressure, oximetry, and rhythm, in addition to total moderation time.   ACCESS/CATHETER PLACEMENT:   The groin was preppe artery disease - LM: Mild disease - LAD: 99% proximal LAD stenosis - LCx: 80% mid vessel stenosis - RCA: Highly ectatic, tandem 90% stenoses 2.  IABP Placement Successful placement of 40cc IABP  RECOMMENDATIONS: -Heparin infusion -ASA 81mg qday -nitroglycer below FINDINGS:  1. Selective coronary angiography:  Left main artery: The left main artery is a large caliber vessel with mild luminal irregularities.  LAD:  The left anterior descending artery is a large caliber vessel with a 99% proximal stenosis and mi [place], [address]                                                               [phone] ---------------------------------------------------------------------------- Transthoracic Echocardiogram Name:Archana Maurer Date: ---------------------------------------------------------------------------- * Left ventricle: The cavity size was normal. Wall thickness was normal. Systolic function was moderately reduced. The estimated ejection fraction was 35-40%.  Mid-distal anterior ---------------------------------------------------------------------------- Measurements  Left ventricle                      Value        Reference  LV ID, ED, PLAX                     5.3   cm     4.2 - 5.9  LV ID, ES, PLAX                     3.9   cm (Diuretic)  Heparin Sodium (Porcine) 5000 UNIT/ML injection 5,000 Units, 5,000 Units, Subcutaneous, 2 times per day  Dexmedetomidine HCl (PRECEDEX) 800 mcg in sodium chloride 0.9% 100 mL infusion, 0.2-1.5 mcg/kg/hr (Dosing Weight), Intravenous, Continuous mEq, Intravenous, PRN  calcium gluconate 3 g in sodium chloride 0.9% 100 mL IVPB, 3 g, Intravenous, PRN  Magnesium Sulfate in D5W IVPB premix 1 g, 1 g, Intravenous, PRN  Magnesium Sulfate IVPB premix SOLN 2 g, 2 g, Intravenous, PRN  mupirocin (BACTROBAN) 2 patient and/or family by bedside.     Alistair Chapin  Internal Medicine  [facility]

## 2021-08-08 NOTE — PROGRESS NOTES
[facility] [address] [place] [address] Cardiology Progress Note    Breana Axtell Patient Status:  Inpatient    1955 MRN [id number]   [address] Attending Ash Angulo MD   Hosp Day # 4 PCP Mindi Irby MD     Outpatient cardiologist: Johanna Membreno monitor  -per Primary service     Plan:   Increase activity, consider transfer to step down  Monitor renal function and when back to baseline add ACE/ ARB  Would add Spironolactone once Cr trending done     Cardiology Attending Progress Note  Patient exami (145.5 kg)  08/05/21 0600 : (!) 309 lb 8.4 oz (140.4 kg)  08/04/21 2003 : 299 lb 6.2 oz (135.8 kg)  08/04/21 1417 : 300 lb (136.1 kg)  05/04/21 1250 : (!) 312 lb (141.5 kg)  05/03/21 1034 : (!) 313 lb (142 kg)      Physical Exam:  Temp:  [98 °F (36.7 °C)-9 > = values in this interval not displayed.        Chem:  Recent Labs   Lab 08/05/21  0616 08/05/21  1601 08/06/21  0403 08/07/21  0308 08/08/21  0446    143 145 143 138   K 4.6 4.4 4.2 4.0 3.9    112 116* 114* 106   CO2 21.0 22.0 23.0 23.0 27.0

## 2021-08-08 NOTE — PROGRESS NOTES
Cardiology follow-up   Called to bedside secondary to abnormal EKG. Patient having recurrent monomorphic nonsustained ventricular tachycardia heart rate of approximately 150 bpm.  Patient complains of a little left-sided chest discomfort.   Longest run was

## 2021-08-08 NOTE — PLAN OF CARE
Patient care assumed 1900 in bed, NSR, room air, gibbs in place, to be removed in the morning. A-V wires secured to belly, sternal incision painted with betadine. Pain tolerable.  Up to chair with mild assist

## 2021-08-08 NOTE — OCCUPATIONAL THERAPY NOTE
OCCUPATIONAL THERAPY EVALUATION - INPATIENT     Room Number: 4524/9071-E  Evaluation Date: 8/8/2021  Type of Evaluation: Initial  Presenting Problem: chest pain, NSTEMI, CAD, 8/5 CABG    Physician Order: IP Consult to Occupational Therapy  Reason for DEWAYNE R [facility] COLONOSCOPY,BIOPSY N/A 3/24/2015    Procedure: COLONOSCOPY, POSSIBLE BIOPSY, POSSIBLE POLYPECTOMY 69058;  Surgeon: Joshua Rubalcava MD;  Location: [address] [place] [address]   • EGD N/A 9/10/2020    Procedure: ESOPHAGOGASTRODUODENOSCOPY, COLONOSCOPY, POSSIBLE replied, \"I am working on finding one. \"       OBJECTIVE  Precautions: Sternal;Cardiac  Fall Risk: Standard fall risk    WEIGHT BEARING RESTRICTION  Weight Bearing Restriction: None                PAIN ASSESSMENT  Ratin          COGNITION  Overall Cog stand, CGA dynamic standing balance while pt held the urinal.   Room air, 95%. Educated the pt about sternal precautions and plan of care. Issued CV binder. Pt was left with RN. Patient End of Session: Up in chair; With [address] staff;Needs met;Call light w perform grooming: with modified independent and while standing at sink  Patient will perform upper body dressing:  with modified independent  Patient will perform lower body dressing:  with modified independent  Patient will perform toileting: with zack

## 2021-08-09 LAB
ANION GAP SERPL CALC-SCNC: 6 MMOL/L (ref 0–18)
ATRIAL RATE: 125 BPM
BUN BLD-MCNC: 46 MG/DL (ref 7–18)
CALCIUM BLD-MCNC: 8.6 MG/DL (ref 8.5–10.1)
CHLORIDE SERPL-SCNC: 110 MMOL/L (ref 98–112)
CO2 SERPL-SCNC: 15 MMOL/L (ref 21–32)
CREAT BLD-MCNC: 1.97 MG/DL
ERYTHROCYTE [DISTWIDTH] IN BLOOD BY AUTOMATED COUNT: 15.4 %
GLUCOSE BLD-MCNC: 115 MG/DL (ref 70–99)
GLUCOSE BLD-MCNC: 154 MG/DL (ref 70–99)
GLUCOSE BLD-MCNC: 221 MG/DL (ref 70–99)
GLUCOSE BLD-MCNC: 244 MG/DL (ref 70–99)
GLUCOSE BLD-MCNC: 59 MG/DL (ref 70–99)
GLUCOSE BLD-MCNC: 61 MG/DL (ref 70–99)
GLUCOSE BLD-MCNC: 67 MG/DL (ref 70–99)
GLUCOSE BLD-MCNC: 68 MG/DL (ref 70–99)
GLUCOSE BLD-MCNC: 74 MG/DL (ref 70–99)
GLUCOSE BLD-MCNC: 80 MG/DL (ref 70–99)
GLUCOSE BLD-MCNC: 92 MG/DL (ref 70–99)
HCT VFR BLD AUTO: 31.2 %
HGB BLD-MCNC: 9.4 G/DL
MCH RBC QN AUTO: 29.7 PG (ref 26–34)
MCHC RBC AUTO-ENTMCNC: 30.1 G/DL (ref 31–37)
MCV RBC AUTO: 98.4 FL
OSMOLALITY SERPL CALC.SUM OF ELEC: 282 MOSM/KG (ref 275–295)
P AXIS: 55 DEGREES
P-R INTERVAL: 178 MS
PLATELET # BLD AUTO: 284 10(3)UL (ref 150–450)
POTASSIUM SERPL-SCNC: 3.5 MMOL/L (ref 3.5–5.1)
Q-T INTERVAL: 298 MS
QRS DURATION: 152 MS
QTC CALCULATION (BEZET): 430 MS
R AXIS: 108 DEGREES
RBC # BLD AUTO: 3.17 X10(6)UL
SODIUM SERPL-SCNC: 131 MMOL/L (ref 136–145)
T AXIS: 55 DEGREES
VENTRICULAR RATE: 125 BPM
WBC # BLD AUTO: 15.7 X10(3) UL (ref 4–11)

## 2021-08-09 PROCEDURE — 80048 BASIC METABOLIC PNL TOTAL CA: CPT | Performed by: PHYSICIAN ASSISTANT

## 2021-08-09 PROCEDURE — 85027 COMPLETE CBC AUTOMATED: CPT | Performed by: PHYSICIAN ASSISTANT

## 2021-08-09 PROCEDURE — 82962 GLUCOSE BLOOD TEST: CPT

## 2021-08-09 RX ORDER — METOPROLOL SUCCINATE 25 MG/1
25 TABLET, EXTENDED RELEASE ORAL
Status: DISCONTINUED | OUTPATIENT
Start: 2021-08-09 | End: 2021-08-09

## 2021-08-09 RX ORDER — METOPROLOL SUCCINATE 50 MG/1
50 TABLET, EXTENDED RELEASE ORAL
Status: DISCONTINUED | OUTPATIENT
Start: 2021-08-10 | End: 2021-08-10

## 2021-08-09 RX ORDER — AMIODARONE HYDROCHLORIDE 200 MG/1
400 TABLET ORAL 2 TIMES DAILY WITH MEALS
Status: DISCONTINUED | OUTPATIENT
Start: 2021-08-09 | End: 2021-08-11

## 2021-08-09 RX ORDER — FUROSEMIDE 40 MG/1
40 TABLET ORAL
Status: DISCONTINUED | OUTPATIENT
Start: 2021-08-09 | End: 2021-08-11

## 2021-08-09 RX ORDER — POTASSIUM CHLORIDE 20 MEQ/1
40 TABLET, EXTENDED RELEASE ORAL EVERY 4 HOURS
Status: COMPLETED | OUTPATIENT
Start: 2021-08-09 | End: 2021-08-09

## 2021-08-09 NOTE — PROGRESS NOTES
[facility] Cardiology  Progress Note    Jack Armenta Patient Status:  Inpatient    1955 MRN [id number]   [facility] 6NE-A Attending Paulino Zepeda MD   Hosp Day # 5 PCP Brian Park MD     Reason for consultation: NST appreciated  Lungs: Clear to auscultation bilaterally; no accessory muscle use  Abdomen: Soft, non-tender; bowel sounds are normoactive  Extremities: No clubbing/cyanosis; moves all 4 extremities normally    potassium chloride (K-DUR M20) CR tab 40 mEq, 40 (ALBUMINAR) 5 % solution 250 mL, 250 mL, Intravenous, Once PRN  DOBUTamine in D5W (DOBUTREX) 500 mg/250 ml infusion, 2.5-20 mcg/kg/min (Dosing Weight), Intravenous, Continuous PRN  nitroGLYCERIN infusion 50mg in D5W 250ml, 5-300 mcg/min, Intravenous, Peter George 284.0 08/09/2021     Lab Results   Component Value Date    INR 1.28 (H) 08/05/2021    INR 1.41 (H) 08/05/2021    INR 0.99 08/04/2021     Lab Results   Component Value Date     08/09/2021    K 3.5 08/09/2021     08/09/2021    CO2 15.0 08/09/2021

## 2021-08-09 NOTE — CM/SW NOTE
73 yo sp NSTEMI, POD #4 CABG. Post op protocol orders for discharge planning. HOME SITUATION  Type of Home: House   Home Layout: Two level;Bed/bath upstairs  Stairs to Enter : 2 (thru garage)  Stairs to [employer]: 14  Railing:  Yes     Lives With: Kian Nones

## 2021-08-09 NOTE — PROGRESS NOTES
[facility]     CV Surgery Progress Note    Christian Schwartz Patient Status:  Inpatient    1955 MRN [id number]   [facility] 6NE-A Attending Lisa Ackerman MD   Hosp Day # 5 PCP Sera Quintana MD     Subjective:  Per marsha FELTON NSVT/vent bigeminy, on amiodarone and BB- management per cardiology/EP   -LVEF 40%- management per cardiology   -Leukocytosis, likely reactive, afebrile- trend CBC  -Acute post op blood loss anemia, expected, stable- monitor   -FRANKLYN, Cr 1.97 today/improving

## 2021-08-09 NOTE — PLAN OF CARE
Assumed care of pt @ 0730. Pt up in chair, walking halls with standby assist. 40 Lasix given with good output. IV in R forearm became very painful, leaking, and hard to flush. No signs of infiltration however.  IV attempted by RN and anesthesia with Hawa Doshi

## 2021-08-09 NOTE — PLAN OF CARE
Assumed care of pt @4642. NSR with frequent PVCs and vent bigeminy. Additional 25mg metoprolol given at start of shift as well as an amio bolus. Amio @0.5. Breath sounds clear, diminished bilaterally. Cpap at night.      2243:  paged for pt going

## 2021-08-09 NOTE — PROGRESS NOTES
CESILIAG Hospitalist Progress Note     [facility]      SUBJECTIVE:  Feeling ok  Glucose low this AM  Had NSVT overnight -- was on IV amiodarone    OBJECTIVE:  Temp:  [97.2 °F (36.2 °C)-98.7 °F (37.1 °C)] 97.2 °F (36.2 °C)  Pulse:  [] 68  Resp:  [11 59*    < > = values in this interval not displayed.        Recent Labs   Lab 08/04/21  1537 08/05/21  0616   ALT 52 45   AST 52* 98*   ALB 3.4 3.0*       Recent Labs   Lab 08/09/21  0617 08/09/21  9687 08/09/21  0650 08/09/21  0828 08/09/21  1126   PGLU 68* marker projecting just below the level of the aortic arch. Mediastinum and eliot are otherwise unchanged. Sternal wires are midline. Chest wall structures are otherwise unremarkable. CONCLUSION:  1.  Support tubes and lines are not significantl CONCLUSION:  1. Patient is status post thoracic surgery/cardiac surgery with evidence of cardiomegaly and mild pulmonary edema/pulmonary vascular congestion. 2. Suspicion for mild left pleural effusion and passive atelectasis of the left lung base.  3. patient was brought to the cardiac catheterization lab in the fasting state. Informed consent was obtained. Moderate sedation was employed using a total of IV Versed 2mg and IV fentanyl 25mg. .  I directly observed the patient from  to 3885, for a tot IABP was advanced under fluoroscopic guidance until appropriate position achieved. The IABP was stat-locked in place. MEDICATIONS:  See nursing record. COMPLICATIONS:  No major complications were observed during this visit to the catheterization lab.   IMP needle, and a 6-Thai 11 cm sheath was placed. Left and right selective coronary angiography was performed using 6-Thai JL4 and JR4 catheters respectively.  At the conclusion of the study, right femoral artery angiography was performed to determine suit DOPPLER CONTRAST (CPT=93306)    Result Date: 8/5/2021                                                     *[facility]* relaxation -    grade 1 diastolic dysfunction. 2. Left atrium: The left atrium was mildly dilated. 3. No significant valvular abnormalities. Impressions: This study is compared with previous dated 03-17-20:  Moderate left ventricle systolic dysfunction wit sinus of Valsalva. Aortic root: The aortic root was not dilated. Pulmonary artery:    Systolic pressure could not be accurately estimated. Systemic veins: Inferior vena cava:  The vessel was normally collapsible and normal in size. ------------------------- 25 mg, Oral, Daily Beta Blocker  insulin detemir (LEVEMIR) 100 UNIT/ML flextouch 85 Units, 85 Units, Subcutaneous, Nightly  Amiodarone HCl (CORDARONE) 450 mg in dextrose 5 % 250 mL infusion, 0.5 mg/min, Intravenous, Continuous  furosemide (LASIX) injection mg, 325 mg, Oral, Daily   Or  aspirin 300 MG rectal suppository 300 mg, 300 mg, Rectal, Daily  potassium chloride IVPB premix 20 mEq, 20 mEq, Intravenous, PRN   Or  potassium chloride IVPB premix 40 mEq, 40 mEq, Intravenous, PRN  calcium gluconate 3 g in s ordered for tonight,  Monitor glucose today to determine if needs to be adjustment   - SSI -- high dose     #Obesity  - outpatient f/u    # Leukocytosis  - overal stable, no fevers noted overnight  - monitor    Prophy:  Deconditioning prevention: PT    Dis

## 2021-08-09 NOTE — CONSULTS
I was asked by Dr. Sean Perez to evaluate for NSVT    72year old male with PMhx as below admitted with CP --> cath with 3vsl disease --> urgent CABG as below --> POD4. Usual post-operative course    Yesterday evening began having bursts of NSVT.   5-7 beats then Sodium (Porcine)  5,000 Units Subcutaneous 2 times per day   • docusate sodium  100 mg Oral BID   • aspirin EC  325 mg Oral Daily    Or   • aspirin  300 mg Rectal Daily   • mupirocin  1 Application Nasal BID   • pantoprazole (PROTONIX) IV push  40 mg Intra 08/06/21  0403 08/07/21  0308 08/08/21  0446 08/08/21  1816 08/08/21  2254 08/09/21  0453      < > 138   < > 143   < > 145  --  143 138 138  --  131*   K 4.5   < > 4.6   < > 4.4   < > 4.2   < > 4.0 3.9 3.8 3.6 3.5      < > 108   < > 112   < > 1 total then start oral amio 400 bid x 1 week, decrease to 200 bid x 1 month post-op. Patient will need lifevest on discharge for risk of SCD. Changed lopressor 50 bid to toprol 50 bid. Uptitrate to 100 bid tomorrow as BP/HR allow.   If syncope or sustaine

## 2021-08-09 NOTE — PROGRESS NOTES
Assumed care at 0730. Patient SR until 11 am when up to BR. Ventricular bigeminy noted. Cardiology notified for increased ectopy. IV metoprolol ordered. Notified Dr. Rosy Purcell Setting RN, asked if pacer wires should still be removed.   Instructed to proceed wi

## 2021-08-10 LAB
ANION GAP SERPL CALC-SCNC: 3 MMOL/L (ref 0–18)
BUN BLD-MCNC: 41 MG/DL (ref 7–18)
CALCIUM BLD-MCNC: 8.5 MG/DL (ref 8.5–10.1)
CHLORIDE SERPL-SCNC: 108 MMOL/L (ref 98–112)
CO2 SERPL-SCNC: 27 MMOL/L (ref 21–32)
CREAT BLD-MCNC: 1.72 MG/DL
ERYTHROCYTE [DISTWIDTH] IN BLOOD BY AUTOMATED COUNT: 15 %
GLUCOSE BLD-MCNC: 104 MG/DL (ref 70–99)
GLUCOSE BLD-MCNC: 109 MG/DL (ref 70–99)
GLUCOSE BLD-MCNC: 163 MG/DL (ref 70–99)
GLUCOSE BLD-MCNC: 201 MG/DL (ref 70–99)
GLUCOSE BLD-MCNC: 229 MG/DL (ref 70–99)
HCT VFR BLD AUTO: 27.1 %
HGB BLD-MCNC: 8.6 G/DL
MCH RBC QN AUTO: 29.5 PG (ref 26–34)
MCHC RBC AUTO-ENTMCNC: 31.7 G/DL (ref 31–37)
MCV RBC AUTO: 92.8 FL
OSMOLALITY SERPL CALC.SUM OF ELEC: 296 MOSM/KG (ref 275–295)
PLATELET # BLD AUTO: 306 10(3)UL (ref 150–450)
POTASSIUM SERPL-SCNC: 3.8 MMOL/L (ref 3.5–5.1)
RBC # BLD AUTO: 2.92 X10(6)UL
SODIUM SERPL-SCNC: 138 MMOL/L (ref 136–145)
WBC # BLD AUTO: 11.5 X10(3) UL (ref 4–11)

## 2021-08-10 PROCEDURE — 85027 COMPLETE CBC AUTOMATED: CPT | Performed by: PHYSICIAN ASSISTANT

## 2021-08-10 PROCEDURE — 82962 GLUCOSE BLOOD TEST: CPT

## 2021-08-10 PROCEDURE — 80048 BASIC METABOLIC PNL TOTAL CA: CPT | Performed by: PHYSICIAN ASSISTANT

## 2021-08-10 RX ORDER — METOPROLOL SUCCINATE 50 MG/1
50 TABLET, EXTENDED RELEASE ORAL
Status: DISCONTINUED | OUTPATIENT
Start: 2021-08-10 | End: 2021-08-11

## 2021-08-10 RX ORDER — POTASSIUM CHLORIDE 20 MEQ/1
40 TABLET, EXTENDED RELEASE ORAL ONCE
Status: COMPLETED | OUTPATIENT
Start: 2021-08-10 | End: 2021-08-10

## 2021-08-10 RX ORDER — SPIRONOLACTONE 25 MG/1
12.5 TABLET ORAL DAILY
Status: DISCONTINUED | OUTPATIENT
Start: 2021-08-10 | End: 2021-08-11

## 2021-08-10 NOTE — PLAN OF CARE
Assumed care at 0730. Pt alert and oriented x4, VSS, NSR on tele. Pt up and ambulating with x1 assist, some dyspnea with exertion. Pedal pulses per doppler. Plan for pt to be fitted for life vest tomorrow. Will continue to monitor.

## 2021-08-10 NOTE — PROGRESS NOTES
[facility] Hospitalist Progress Note     [facility]      SUBJECTIVE:  Feeling well today  No acute events overnight  Blood glucose  better    OBJECTIVE:  Temp:  [97.6 °F (36.4 °C)-98.5 °F (36.9 °C)] 98 °F (36.7 °C)  Pulse:  [73-98] 82  Resp:  [13-25] 22  B 2.03* 2.15*  --  1.97* 1.72*   CA 8.3*   < > 8.6   < > 8.1*  --  8.3* 8.6  --  8.6 8.5   MG 1.9  --  1.4*  --   --   --  2.1 2.2 2.0  --   --    *   < > 118*   < > 111*  --  195* 149*  --  59* 104*    < > = values in this interval not displayed. is some interval clearing of opacity from the right perihilar lung and left lung base which probably represents improved atelectasis. Heart size is unchanged.   There is an intra-aortic balloon pump with marker projecting just below the level of the aortic noted with the tip extending into the proximal aspect of the right main pulmonary artery. There is an NG tube noted with the tip extending into the non visualized portion of the stomach. CONCLUSION:  1.  Patient is status post thoracic surgery/c CATHETERIZATION/PERCUTANEOUS CORONARY INTERVENTION REPORT  PREOPERATIVE DIAGNOSIS:  NSTEMI POSTOPERATIVE DIAGNOSIS:  Same.  PROCEDURE PERFORMED:  Selective coronary angiography, IABP placement   PROCEDURE:  The patient was brought to the cardiac catheteriza mild-moderate diffuse disease After angiography and consultation with referring provided, decision made to place IABP and consult cardiac surgery. The 6Fr sheath was exchanged for a 7.5Fr arrow IABP sheath.  A 40cc IABP was advanced under fluoroscopic wilner time.  ACCESS/CATHETER PLACEMENT:   The groin was prepped and draped in a sterile manner, and the right groin area was anesthetized with 2% lidocaine area.   The right femoral artery was accessed with a micropuncture needle, and a 6-Venezuelan 11 cm sheath was RECOMMENDATIONS: -Heparin infusion -ASA 81mg qday -nitroglycerin, titrate to chest pain -Statin -Cardiac surgery consultation.  I have discussed with on call surgeon -PRN morphine for refractory chest pain    CARD ECHO 2D DOPPLER CONTRAST (CPT=93306)    Res was normal.    Systolic function was moderately reduced. The estimated ejection fraction    was 35-40%. Mid-distal anterior, inferior and apical akinesis.  Doppler    parameters are consistent with abnormal left ventricular relaxation -    grade 1 diastolic within the normal range. There was no evidence for stenosis. There was no significant regurgitation. Pericardium:  There was no pericardial effusion. Aorta:  Ascending aorta diameter was 3.6cm. Aortic root was 3.2cm at the sinus of Valsalva.  Aortic root: T file.      sacubitril-valsartan (ENTRESTO) 24-26 MG per tab 1 tablet, 1 tablet, Oral, BID  spironolactone (ALDACTONE) partial tablet 12.5 mg, 12.5 mg, Oral, Daily  metoprolol succinate (Toprol XL) 24 hr tab 50 mg, 50 mg, Oral, 2x Daily(Beta Blocker)  Insul Weight), Intravenous, Continuous PRN  nitroGLYCERIN infusion 50mg in D5W 250ml, 5-300 mcg/min, Intravenous, Continuous PRN  norepinephrine (LEVOPHED) 4 mg/250 ml premix infusion, 0.5-30 mcg/min, Intravenous, Continuous PRN  Nitroprusside Sodium (NIPRIDE) 5 per CV surgery post CABG    # NSVT  - EP saw  - on IV amio with plans to transition to PO  - recommending life vest for discharge    # DM Type II  - glucose better today  - levemir 20 units nightly --> may need to further titrate up pending glucose trend

## 2021-08-10 NOTE — PROGRESS NOTES
[facility]     CV Surgery Progress Note    Ammon Ng Patient Status:  Inpatient    1955 MRN [id number]   [facility] 6NE-A Attending Prashant Nieto MD   [facility] Day # 6 PCP Tahira Beavers MD     Subjective:  No acute even discharge.    -LVEF 40%- management per cardiology   -Leukocytosis, likely reactive, down trending, afebrile- monitor CBC  -Acute post op blood loss anemia, expected, stable- monitor   -FRANKLYN, Cr 1.72 today/improving, good UOP- monitor   -Volume OL, weight up

## 2021-08-10 NOTE — PLAN OF CARE
Assumed care of pt @1279. A+Ox4. Denies pain at this time. NSR. HR 70's. VSS on RA while awake, Cpap for sleep. Bilateral post tib pulses +1. Doppler pulse for bilateral pedals. Edematous. Episodes of hypoglycemia during the day.  Night time blood glucose c

## 2021-08-10 NOTE — PROGRESS NOTES
[facility] Cardiology  Progress Note    Romana Chester Patient Status:  Inpatient    1955 MRN [id number]   [facility] 6NE-A Attending Nayeli Alvarenga MD   Hosp Day # 6 PCP Taran Kothari MD     Reason for consultation: NST auscultation bilaterally; no accessory muscle use  Abdomen: Soft, non-tender; bowel sounds are normoactive  Extremities: No clubbing/cyanosis; moves all 4 extremities normally    Insulin Aspart Pen (NOVOLOG) 100 UNIT/ML flexpen 4-20 Units, 4-20 Units, Subc (Dosing Weight), Intravenous, Continuous PRN  nitroGLYCERIN infusion 50mg in D5W 250ml, 5-300 mcg/min, Intravenous, Continuous PRN  norepinephrine (LEVOPHED) 4 mg/250 ml premix infusion, 0.5-30 mcg/min, Intravenous, Continuous PRN  Nitroprusside Sodium (NI 08/10/2021    CO2 27.0 08/10/2021    BUN 41 08/10/2021    CREATSERUM 1.72 08/10/2021     08/10/2021    CA 8.5 08/10/2021       Telemetry: No malignant tachyarrhythmias or bradyarrhythmias      Thank you for allowing our practice to participate in th

## 2021-08-10 NOTE — DIETARY NOTE
[address] [facility]     Admitting diagnosis:  NSTEMI (non-ST elevated myocardial infarction) ([facility] [address] [I21.4]    Ht: 180.3 cm (5' 11\")  Wt: (!) 143.4 kg (316 lb 1.6 oz). Body mass index is 44.09 kg/m².   IBW: 78.2 kg    L

## 2021-08-10 NOTE — CM/SW NOTE
Spoke with Jacki Lim with [employer]. The Life vest is approved for pt.   Hoping to have rep out today or am to fit pt for vest.     EBENEZER Larose [address], [address]

## 2021-08-10 NOTE — PROGRESS NOTES
Met with patient to discuss discharge instructions, activity restrictions and recommendations, follow up visits, all questions answered, encouraged to call with any questions or concerns.  Pts wife and children will be home to assist, can help with transpor

## 2021-08-11 VITALS
WEIGHT: 307.56 LBS | OXYGEN SATURATION: 98 % | RESPIRATION RATE: 23 BRPM | HEART RATE: 75 BPM | DIASTOLIC BLOOD PRESSURE: 86 MMHG | HEIGHT: 71 IN | BODY MASS INDEX: 43.06 KG/M2 | SYSTOLIC BLOOD PRESSURE: 130 MMHG | TEMPERATURE: 97 F

## 2021-08-11 LAB
ANION GAP SERPL CALC-SCNC: 7 MMOL/L (ref 0–18)
BUN BLD-MCNC: 38 MG/DL (ref 7–18)
CALCIUM BLD-MCNC: 8.8 MG/DL (ref 8.5–10.1)
CHLORIDE SERPL-SCNC: 110 MMOL/L (ref 98–112)
CO2 SERPL-SCNC: 21 MMOL/L (ref 21–32)
CREAT BLD-MCNC: 1.57 MG/DL
ERYTHROCYTE [DISTWIDTH] IN BLOOD BY AUTOMATED COUNT: 15.3 %
GLUCOSE BLD-MCNC: 142 MG/DL (ref 70–99)
GLUCOSE BLD-MCNC: 176 MG/DL (ref 70–99)
GLUCOSE BLD-MCNC: 251 MG/DL (ref 70–99)
HCT VFR BLD AUTO: 32.8 %
HGB BLD-MCNC: 10.2 G/DL
MCH RBC QN AUTO: 29.8 PG (ref 26–34)
MCHC RBC AUTO-ENTMCNC: 31.1 G/DL (ref 31–37)
MCV RBC AUTO: 95.9 FL
OSMOLALITY SERPL CALC.SUM OF ELEC: 297 MOSM/KG (ref 275–295)
PLATELET # BLD AUTO: 352 10(3)UL (ref 150–450)
POTASSIUM SERPL-SCNC: 3.9 MMOL/L (ref 3.5–5.1)
RBC # BLD AUTO: 3.42 X10(6)UL
SODIUM SERPL-SCNC: 138 MMOL/L (ref 136–145)
WBC # BLD AUTO: 12.6 X10(3) UL (ref 4–11)

## 2021-08-11 PROCEDURE — 97116 GAIT TRAINING THERAPY: CPT

## 2021-08-11 PROCEDURE — 97535 SELF CARE MNGMENT TRAINING: CPT

## 2021-08-11 PROCEDURE — 80048 BASIC METABOLIC PNL TOTAL CA: CPT | Performed by: PHYSICIAN ASSISTANT

## 2021-08-11 PROCEDURE — 97110 THERAPEUTIC EXERCISES: CPT

## 2021-08-11 PROCEDURE — 85027 COMPLETE CBC AUTOMATED: CPT | Performed by: PHYSICIAN ASSISTANT

## 2021-08-11 PROCEDURE — 82962 GLUCOSE BLOOD TEST: CPT

## 2021-08-11 RX ORDER — ASPIRIN 81 MG/1
81 TABLET ORAL DAILY
Qty: 90 TABLET | Refills: 3 | Status: SHIPPED | OUTPATIENT
Start: 2021-08-11 | End: 2022-08-06

## 2021-08-11 RX ORDER — SPIRONOLACTONE 25 MG/1
25 TABLET ORAL DAILY
Status: DISCONTINUED | OUTPATIENT
Start: 2021-08-12 | End: 2021-08-11

## 2021-08-11 RX ORDER — METOPROLOL SUCCINATE 50 MG/1
50 TABLET, EXTENDED RELEASE ORAL
Qty: 180 TABLET | Refills: 3 | Status: SHIPPED | OUTPATIENT
Start: 2021-08-11 | End: 2022-08-06

## 2021-08-11 RX ORDER — HYDROCODONE BITARTRATE AND ACETAMINOPHEN 5; 325 MG/1; MG/1
1-2 TABLET ORAL EVERY 6 HOURS PRN
Qty: 60 TABLET | Refills: 0 | Status: SHIPPED | OUTPATIENT
Start: 2021-08-11 | End: 2021-11-16

## 2021-08-11 RX ORDER — ROSUVASTATIN CALCIUM 40 MG/1
40 TABLET, COATED ORAL NIGHTLY
Qty: 90 TABLET | Refills: 3 | Status: SHIPPED | OUTPATIENT
Start: 2021-08-11 | End: 2022-08-06

## 2021-08-11 RX ORDER — INSULIN DEGLUDEC 200 U/ML
20 INJECTION, SOLUTION SUBCUTANEOUS NIGHTLY
Qty: 3 ML | Refills: 0 | Status: SHIPPED | OUTPATIENT
Start: 2021-08-11 | End: 2021-12-30

## 2021-08-11 RX ORDER — CLOPIDOGREL BISULFATE 75 MG/1
75 TABLET ORAL DAILY
Status: DISCONTINUED | OUTPATIENT
Start: 2021-08-11 | End: 2021-08-11

## 2021-08-11 RX ORDER — FUROSEMIDE 20 MG/1
20 TABLET ORAL DAILY
Qty: 90 TABLET | Refills: 3 | Status: SHIPPED | OUTPATIENT
Start: 2021-08-12 | End: 2021-08-20

## 2021-08-11 RX ORDER — AMIODARONE HYDROCHLORIDE 200 MG/1
400 TABLET ORAL 2 TIMES DAILY
Qty: 120 TABLET | Refills: 2 | Status: SHIPPED | OUTPATIENT
Start: 2021-08-11 | End: 2021-11-09

## 2021-08-11 RX ORDER — INSULIN DEGLUDEC 200 U/ML
30 INJECTION, SOLUTION SUBCUTANEOUS NIGHTLY
Qty: 3 ML | Refills: 0 | Status: SHIPPED | COMMUNITY
Start: 2021-08-11 | End: 2021-08-11

## 2021-08-11 RX ORDER — LIRAGLUTIDE 6 MG/ML
1.8 INJECTION SUBCUTANEOUS DAILY
Refills: 0 | Status: SHIPPED | COMMUNITY
Start: 2021-08-11 | End: 2021-10-04

## 2021-08-11 RX ORDER — CLOPIDOGREL BISULFATE 75 MG/1
75 TABLET ORAL DAILY
Qty: 90 TABLET | Refills: 3 | Status: SHIPPED | OUTPATIENT
Start: 2021-08-11 | End: 2022-08-06

## 2021-08-11 RX ORDER — SPIRONOLACTONE 25 MG/1
25 TABLET ORAL DAILY
Qty: 90 TABLET | Refills: 3 | Status: SHIPPED | OUTPATIENT
Start: 2021-08-12 | End: 2021-12-03

## 2021-08-11 RX ORDER — FUROSEMIDE 20 MG/1
20 TABLET ORAL DAILY
Status: DISCONTINUED | OUTPATIENT
Start: 2021-08-12 | End: 2021-08-11

## 2021-08-11 NOTE — PHYSICAL THERAPY NOTE
PHYSICAL THERAPY TREATMENT NOTE - INPATIENT    Room Number: 8973/4188-D     Session: 1   Number of Visits to Meet Established Goals: 2    Presenting Problem: chest pain, angina   History related to current admission: Patient came in with c/o chest pain/an 9/10/2020    Procedure: ESOPHAGOGASTRODUODENOSCOPY, COLONOSCOPY, POSSIBLE BIOPSY, POSSIBLE POLYPECTOMY 78287, 46870;  Surgeon: Davey Burkitt, MD;  Location: [address] [place] [address]   • FIX QUAD/HAMSTR MUSC RUPT,PRIMARY  12/08   • OTHER SURGICAL HISTORY  198 bpm    MODIFIED DEACON DYSPNEA SCALE - (SHORTNESS OF BREATH SCALE) = 6-7 with stairs    SCORE DESCRIPTION   0 Nothing at all   1 Very slight   2 Slight   3 Moderate   4 Somewhat severe   5 Strong or hard breathing   6    7 Very hard breathing   8    9 Very, provided verbal cues to decrease leeann and speed for safety. Pt required a 1 minutes standing rest break after ascending. Pt was able to recall 1 of 3 sternal precautions. Sternal precautions reviewed.     Pt participated in CV Binder Education with modified independent. Leidy Polk MET 8/11      Goal #3 Patient is able to ambulate 150 feet with assist device: none at assistance level: supervision      Goal #4 Patient ascend/descend 2 stairs to simulate getting in/out of the house thru garage supervision step-to

## 2021-08-11 NOTE — PROGRESS NOTES
Patient fitted for life vest  Vital signs stable at time of discharge  Discharge instructons reviewed with patient and family at bedside  Education on new medications given  Follow up appointments reviewed  All questions answered, sent home with Norco pres

## 2021-08-11 NOTE — DISCHARGE SUMMARY
General Medicine Discharge Summary     Patient ID:  Christian Schwartz  72year old  11/7/1955    Admit date: 8/4/2021    Discharge date and time: 8/11/21    Attending Physician: Quoc Mishra MD     Primary Care Physician: Sera Quintana MD     Reason fo EDUCATION  IP CONSULT TO SOCIAL WORK  IP CONSULT TO RESPIRATORY CARE  IP CONSULT TO RESPIRATORY CARE  IP CONSULT TO SOCIAL WORK    Operative Procedures: Procedure(s) (LRB):  CORONARY ARTERY BYPASS GRAFT x3, Intraoperative transesophageal echocardiogram. Ta mg/dL  Call Physician if blood glucose is greater than 351 mg/dL    Liraglutide (VICTOZA) 18 MG/3ML Subcutaneous Solution Pen-injector  Inject 1.8 mg into the skin daily.     Insulin Degludec (TRESIBA FLEXTOUCH) 200 UNIT/ML Subcutaneous Solution Pen-injecto

## 2021-08-11 NOTE — HOME CARE LIAISON
Patient provided with list of [place] [address] providers from [facility], patient choice is [address]. Agency reserved in [address] [place] [address] and contact information placed on AVS.   Financial interest disclosure provided to patient.

## 2021-08-11 NOTE — PROGRESS NOTES
[facility]     CV Surgery Progress Note    Cuca Acevedo Patient Status:  Inpatient    1955 MRN [id number]   [facility] 6NE-A Attending Tyler Bauman MD   [facility] Day # 7 PCP Mick Mcallister MD     Subjective:  No acute even discharge.    -LVEF 40%- management per cardiology   -Leukocytosis, likely reactive/atelectasis, afebrile- monitor CBC  -Acute post op blood loss anemia, expected, stable- monitor   -FRANKLYN, Cr 1.57 today/improving, good UOP- monitor   -Volume OL, weight up fr

## 2021-08-11 NOTE — CM/SW NOTE
08/11/21 1200   Discharge disposition   Expected discharge disposition Home-Health   Post Acute Care Provider Residential      met with pt, wife and son. Plan is to dc today with [facility],. Erma Hall was delivered today also.   Provided pt with [place]

## 2021-08-11 NOTE — OCCUPATIONAL THERAPY NOTE
OCCUPATIONAL THERAPY TREATMENT NOTE - INPATIENT     Room Number: 8827/0620-K  Session: 1   Number of Visits to Meet Established Goals: 5    Presenting Problem: chest pain, NSTEMI, CAD, 8/5 CABG    History related to current admission: Patient came in with • EGD N/A 9/10/2020    Procedure: ESOPHAGOGASTRODUODENOSCOPY, COLONOSCOPY, POSSIBLE BIOPSY, POSSIBLE POLYPECTOMY 44633, 72250;  Surgeon: Kendal Green MD;  Location: [address] [place] [address]   • FIX QUAD/HAMSTR [facility]C [facility],PRIMARY  12/08   • OTHER SURGICAL rinsing, drying)?: None  -   Toileting, which includes using toilet, bedpan or urinal? : None  -   Putting on and taking off regular upper body clothing?: None  -   Taking care of personal grooming such as brushing teeth?: None  -   Eating meals?: None simplification techniques;ADL training;Functional transfer training;UE strengthening/ROM; Patient/Family education  Rehab Potential : Good  Frequency (Obs): 3-5x/week      OT Goals:  ALL MET as of 8/11

## 2021-08-11 NOTE — PROGRESS NOTES
[address] [facility] Cardiology  Progress Note    Nadine Escobar Patient Status:  Inpatient    1955 MRN [id number]   [facility] 6NE-A Attending Ainsley Gomez MD   Hosp Day # 7 PCP Mehrdad Rudolph MD     Reason for consultation: NST normoactive  Extremities: No clubbing/cyanosis; moves all 4 extremities normally    [START ON 8/12/2021] furosemide (LASIX) tab 20 mg, 20 mg, Oral, Daily  [START ON 8/12/2021] spironolactone (ALDACTONE) tab 25 mg, 25 mg, Oral, Daily  [START ON 8/12/2021] s mL, Intravenous, Once PRN  DOBUTamine in D5W (DOBUTREX) 500 mg/250 ml infusion, 2.5-20 mcg/kg/min (Dosing Weight), Intravenous, Continuous PRN  nitroGLYCERIN infusion 50mg in D5W 250ml, 5-300 mcg/min, Intravenous, Continuous PRN  norepinephrine (LEVOPHED) 08/04/2021     Lab Results   Component Value Date     08/11/2021    K 3.9 08/11/2021     08/11/2021    CO2 21.0 08/11/2021    BUN 38 08/11/2021    CREATSERUM 1.57 08/11/2021     08/11/2021    CA 8.8 08/11/2021       Telemetry: No maligna

## 2021-08-16 ENCOUNTER — TELEPHONE (OUTPATIENT)
Dept: CARDIOLOGY UNIT | Facility: HOSPITAL | Age: 66
End: 2021-08-16

## 2021-08-16 NOTE — PROGRESS NOTES
Follow Up Phone Call    1. How are you doing now that you are home? Patient states from a surgery perspective he is doing well. Patient states some frustration with the Life Vest, states it is cumbersome, but will continue to wear it as long as doctor tells him. 2. Have there been any changes in your wound/incision since going home? Patient states he incision looks good and will remove SS 8/19    3. Is your pain manageable at home? Patient states his incision pain is minimal, however had to take some Tylenol yesterday for some ear pain he had. Ear pain is gone today and he has no fevers. 4. Are you following the walking routine given to you in the hospital? yes    5. Are you continuing to use your incentive spirometer? \"Yes, I keep it near me when I watch TV and use it. \"    6. Do you have your appointments for Chest Xray? 8/18                                                              Primary MD? To call                                                                Cardiologist?  8/20                                                              Dr. Villar Gave? 9/1                                                              Cardiac Rehab?  10/12    7. Do you have any other questions or concerns today?  no        Monica Garcia RN  8/16/2021  1:09 PM

## 2021-08-17 PROBLEM — E78.2 MIXED HYPERLIPIDEMIA DUE TO TYPE 2 DIABETES MELLITUS (HCC): Status: ACTIVE | Noted: 2020-03-30

## 2021-08-17 PROBLEM — E11.69 MIXED HYPERLIPIDEMIA DUE TO TYPE 2 DIABETES MELLITUS: Status: ACTIVE | Noted: 2020-03-30

## 2021-08-17 PROBLEM — E11.69 MIXED HYPERLIPIDEMIA DUE TO TYPE 2 DIABETES MELLITUS (HCC): Status: ACTIVE | Noted: 2020-03-30

## 2021-08-17 PROBLEM — I25.2 HISTORY OF NON-ST ELEVATION MYOCARDIAL INFARCTION (NSTEMI): Status: ACTIVE | Noted: 2021-08-17

## 2021-08-17 PROBLEM — E78.2 MIXED HYPERLIPIDEMIA DUE TO TYPE 2 DIABETES MELLITUS: Status: ACTIVE | Noted: 2020-03-30

## 2021-08-18 ENCOUNTER — HOSPITAL ENCOUNTER (OUTPATIENT)
Dept: GENERAL RADIOLOGY | Facility: HOSPITAL | Age: 66
Discharge: HOME | End: 2021-08-18
Attending: THORACIC SURGERY (CARDIOTHORACIC VASCULAR SURGERY)
Payer: MEDICARE

## 2021-08-18 ENCOUNTER — LAB ENCOUNTER (OUTPATIENT)
Dept: LAB | Facility: HOSPITAL | Age: 66
End: 2021-08-18
Attending: THORACIC SURGERY (CARDIOTHORACIC VASCULAR SURGERY)
Payer: MEDICARE

## 2021-08-18 DIAGNOSIS — E11.69 MIXED HYPERLIPIDEMIA DUE TO TYPE 2 DIABETES MELLITUS (HCC): ICD-10-CM

## 2021-08-18 DIAGNOSIS — J90 PLEURAL EFFUSION: ICD-10-CM

## 2021-08-18 DIAGNOSIS — E66.01 MORBID OBESITY WITH BMI OF 40.0-44.9, ADULT (HCC): ICD-10-CM

## 2021-08-18 DIAGNOSIS — E11.65 UNCONTROLLED TYPE 2 DIABETES MELLITUS WITH HYPERGLYCEMIA, WITH LONG-TERM CURRENT USE OF INSULIN (HCC): ICD-10-CM

## 2021-08-18 DIAGNOSIS — Z99.89 OSA ON CPAP: ICD-10-CM

## 2021-08-18 DIAGNOSIS — Z79.4 UNCONTROLLED TYPE 2 DIABETES MELLITUS WITH HYPERGLYCEMIA, WITH LONG-TERM CURRENT USE OF INSULIN (HCC): ICD-10-CM

## 2021-08-18 DIAGNOSIS — E83.42 HYPOMAGNESEMIA: ICD-10-CM

## 2021-08-18 DIAGNOSIS — E78.2 MIXED HYPERLIPIDEMIA DUE TO TYPE 2 DIABETES MELLITUS (HCC): ICD-10-CM

## 2021-08-18 DIAGNOSIS — G47.33 OSA ON CPAP: ICD-10-CM

## 2021-08-18 LAB
ALBUMIN SERPL-MCNC: 3.2 G/DL (ref 3.4–5)
ALBUMIN/GLOB SERPL: 0.7 {RATIO} (ref 1–2)
ALP LIVER SERPL-CCNC: 112 U/L
ALT SERPL-CCNC: 23 U/L
ANION GAP SERPL CALC-SCNC: 10 MMOL/L (ref 0–18)
AST SERPL-CCNC: 22 U/L (ref 15–37)
BASOPHILS # BLD AUTO: 0.13 X10(3) UL (ref 0–0.2)
BASOPHILS NFR BLD AUTO: 0.9 %
BILIRUB SERPL-MCNC: 0.5 MG/DL (ref 0.1–2)
BUN BLD-MCNC: 27 MG/DL (ref 7–18)
CALCIUM BLD-MCNC: 9.4 MG/DL (ref 8.5–10.1)
CHLORIDE SERPL-SCNC: 104 MMOL/L (ref 98–112)
CO2 SERPL-SCNC: 22 MMOL/L (ref 21–32)
CREAT BLD-MCNC: 1.84 MG/DL
EOSINOPHIL # BLD AUTO: 0.71 X10(3) UL (ref 0–0.7)
EOSINOPHIL NFR BLD AUTO: 4.8 %
ERYTHROCYTE [DISTWIDTH] IN BLOOD BY AUTOMATED COUNT: 15 %
GLOBULIN PLAS-MCNC: 4.5 G/DL (ref 2.8–4.4)
GLUCOSE BLD-MCNC: 194 MG/DL (ref 70–99)
HAV IGM SER QL: 1.9 MG/DL (ref 1.6–2.6)
HCT VFR BLD AUTO: 35.4 %
HGB BLD-MCNC: 10.9 G/DL
IMM GRANULOCYTES # BLD AUTO: 0.25 X10(3) UL (ref 0–1)
IMM GRANULOCYTES NFR BLD: 1.7 %
LYMPHOCYTES # BLD AUTO: 1.64 X10(3) UL (ref 1–4)
LYMPHOCYTES NFR BLD AUTO: 11.1 %
M PROTEIN MFR SERPL ELPH: 7.7 G/DL (ref 6.4–8.2)
MCH RBC QN AUTO: 28.6 PG (ref 26–34)
MCHC RBC AUTO-ENTMCNC: 30.8 G/DL (ref 31–37)
MCV RBC AUTO: 92.9 FL
MONOCYTES # BLD AUTO: 0.78 X10(3) UL (ref 0.1–1)
MONOCYTES NFR BLD AUTO: 5.3 %
NEUTROPHILS # BLD AUTO: 11.21 X10 (3) UL (ref 1.5–7.7)
NEUTROPHILS # BLD AUTO: 11.21 X10(3) UL (ref 1.5–7.7)
NEUTROPHILS NFR BLD AUTO: 76.2 %
OSMOLALITY SERPL CALC.SUM OF ELEC: 292 MOSM/KG (ref 275–295)
PATIENT FASTING Y/N/NP: YES
PLATELET # BLD AUTO: 663 10(3)UL (ref 150–450)
POTASSIUM SERPL-SCNC: 4 MMOL/L (ref 3.5–5.1)
RBC # BLD AUTO: 3.81 X10(6)UL
SODIUM SERPL-SCNC: 136 MMOL/L (ref 136–145)
T4 FREE SERPL-MCNC: 1 NG/DL (ref 0.8–1.7)
TSI SER-ACNC: 10.3 MIU/ML (ref 0.36–3.74)
WBC # BLD AUTO: 14.7 X10(3) UL (ref 4–11)

## 2021-08-18 PROCEDURE — 71048 X-RAY EXAM CHEST 4+ VIEWS: CPT | Performed by: THORACIC SURGERY (CARDIOTHORACIC VASCULAR SURGERY)

## 2021-08-18 PROCEDURE — 84439 ASSAY OF FREE THYROXINE: CPT

## 2021-08-18 PROCEDURE — 85025 COMPLETE CBC W/AUTO DIFF WBC: CPT

## 2021-08-18 PROCEDURE — 83735 ASSAY OF MAGNESIUM: CPT

## 2021-08-18 PROCEDURE — 84443 ASSAY THYROID STIM HORMONE: CPT

## 2021-08-18 PROCEDURE — 80053 COMPREHEN METABOLIC PANEL: CPT

## 2021-08-18 PROCEDURE — 36415 COLL VENOUS BLD VENIPUNCTURE: CPT

## 2021-08-20 NOTE — PROGRESS NOTES
Results with comments released through [employer] by MD and viewed by pt per EMR. Closing this encounter as no further follow up needed at this time.

## 2021-08-30 PROBLEM — IMO0002 UNCONTROLLED TYPE 2 DIABETES MELLITUS WITH CIRCULATORY DISORDER, WITH LONG-TERM CURRENT USE OF INSULIN: Status: ACTIVE | Noted: 2021-08-30

## 2021-08-30 PROBLEM — E11.59 UNCONTROLLED TYPE 2 DIABETES MELLITUS WITH CIRCULATORY DISORDER, WITH LONG-TERM CURRENT USE OF INSULIN (HCC): Status: ACTIVE | Noted: 2021-08-30

## 2021-08-30 PROBLEM — E11.65 UNCONTROLLED TYPE 2 DIABETES MELLITUS WITH CIRCULATORY DISORDER, WITH LONG-TERM CURRENT USE OF INSULIN (HCC): Status: ACTIVE | Noted: 2021-08-30

## 2021-08-30 PROBLEM — E66.9 OBESITY (BMI 30-39.9): Status: ACTIVE | Noted: 2021-08-30

## 2021-08-30 PROBLEM — Z79.4 UNCONTROLLED TYPE 2 DIABETES MELLITUS WITH CIRCULATORY DISORDER, WITH LONG-TERM CURRENT USE OF INSULIN (HCC): Status: ACTIVE | Noted: 2021-08-30

## 2021-08-30 PROBLEM — E78.2 MIXED HYPERLIPIDEMIA DUE TO TYPE 2 DIABETES MELLITUS (HCC): Status: RESOLVED | Noted: 2020-03-30 | Resolved: 2021-08-30

## 2021-08-30 PROBLEM — E11.69 MIXED HYPERLIPIDEMIA DUE TO TYPE 2 DIABETES MELLITUS: Status: RESOLVED | Noted: 2020-03-30 | Resolved: 2021-08-30

## 2021-08-30 PROBLEM — E78.5 HYPERLIPIDEMIA LDL GOAL <70: Status: ACTIVE | Noted: 2021-08-30

## 2021-08-30 PROBLEM — E11.69 MIXED HYPERLIPIDEMIA DUE TO TYPE 2 DIABETES MELLITUS (HCC): Status: RESOLVED | Noted: 2020-03-30 | Resolved: 2021-08-30

## 2021-08-30 PROBLEM — E78.2 MIXED HYPERLIPIDEMIA DUE TO TYPE 2 DIABETES MELLITUS: Status: RESOLVED | Noted: 2020-03-30 | Resolved: 2021-08-30

## 2021-10-12 ENCOUNTER — ORDER TRANSCRIPTION (OUTPATIENT)
Dept: CARDIAC REHAB | Facility: HOSPITAL | Age: 66
End: 2021-10-12

## 2021-10-12 ENCOUNTER — CARDPULM VISIT (OUTPATIENT)
Dept: CARDIAC REHAB | Facility: HOSPITAL | Age: 66
End: 2021-10-12
Attending: INTERNAL MEDICINE
Payer: MEDICARE

## 2021-10-12 VITALS
BODY MASS INDEX: 40.18 KG/M2 | WEIGHT: 287 LBS | SYSTOLIC BLOOD PRESSURE: 108 MMHG | HEIGHT: 71 IN | HEART RATE: 70 BPM | DIASTOLIC BLOOD PRESSURE: 70 MMHG

## 2021-10-12 DIAGNOSIS — I25.810 CORONARY ATHEROSCLEROSIS OF AUTOLOGOUS VEIN BYPASS GRAFT: Primary | ICD-10-CM

## 2021-10-12 PROCEDURE — 93798 PHYS/QHP OP CAR RHAB W/ECG: CPT

## 2021-10-12 PROCEDURE — 82962 GLUCOSE BLOOD TEST: CPT

## 2021-10-13 ENCOUNTER — CARDPULM VISIT (OUTPATIENT)
Dept: CARDIAC REHAB | Facility: HOSPITAL | Age: 66
End: 2021-10-13
Attending: INTERNAL MEDICINE
Payer: MEDICARE

## 2021-10-13 PROCEDURE — 93798 PHYS/QHP OP CAR RHAB W/ECG: CPT

## 2021-10-15 ENCOUNTER — CARDPULM VISIT (OUTPATIENT)
Dept: CARDIAC REHAB | Facility: HOSPITAL | Age: 66
End: 2021-10-15
Attending: INTERNAL MEDICINE
Payer: MEDICARE

## 2021-10-15 PROCEDURE — 93798 PHYS/QHP OP CAR RHAB W/ECG: CPT

## 2021-10-18 ENCOUNTER — CARDPULM VISIT (OUTPATIENT)
Dept: CARDIAC REHAB | Facility: HOSPITAL | Age: 66
End: 2021-10-18
Attending: INTERNAL MEDICINE
Payer: MEDICARE

## 2021-10-18 PROCEDURE — 93798 PHYS/QHP OP CAR RHAB W/ECG: CPT

## 2021-10-20 ENCOUNTER — CARDPULM VISIT (OUTPATIENT)
Dept: CARDIAC REHAB | Facility: HOSPITAL | Age: 66
End: 2021-10-20
Attending: INTERNAL MEDICINE
Payer: MEDICARE

## 2021-10-20 PROCEDURE — 93798 PHYS/QHP OP CAR RHAB W/ECG: CPT

## 2021-10-22 ENCOUNTER — CARDPULM VISIT (OUTPATIENT)
Dept: CARDIAC REHAB | Facility: HOSPITAL | Age: 66
End: 2021-10-22
Attending: INTERNAL MEDICINE
Payer: MEDICARE

## 2021-10-22 PROCEDURE — 93798 PHYS/QHP OP CAR RHAB W/ECG: CPT

## 2021-10-25 ENCOUNTER — CARDPULM VISIT (OUTPATIENT)
Dept: CARDIAC REHAB | Facility: HOSPITAL | Age: 66
End: 2021-10-25
Attending: INTERNAL MEDICINE
Payer: MEDICARE

## 2021-10-25 PROCEDURE — 93798 PHYS/QHP OP CAR RHAB W/ECG: CPT

## 2021-10-27 ENCOUNTER — CARDPULM VISIT (OUTPATIENT)
Dept: CARDIAC REHAB | Facility: HOSPITAL | Age: 66
End: 2021-10-27
Attending: INTERNAL MEDICINE
Payer: MEDICARE

## 2021-10-27 PROCEDURE — 93798 PHYS/QHP OP CAR RHAB W/ECG: CPT

## 2021-10-29 ENCOUNTER — CARDPULM VISIT (OUTPATIENT)
Dept: CARDIAC REHAB | Facility: HOSPITAL | Age: 66
End: 2021-10-29
Attending: INTERNAL MEDICINE
Payer: MEDICARE

## 2021-10-29 PROCEDURE — 93798 PHYS/QHP OP CAR RHAB W/ECG: CPT

## 2021-11-01 ENCOUNTER — CARDPULM VISIT (OUTPATIENT)
Dept: CARDIAC REHAB | Facility: HOSPITAL | Age: 66
End: 2021-11-01
Attending: INTERNAL MEDICINE
Payer: MEDICARE

## 2021-11-01 PROCEDURE — 93798 PHYS/QHP OP CAR RHAB W/ECG: CPT

## 2021-11-03 ENCOUNTER — CARDPULM VISIT (OUTPATIENT)
Dept: CARDIAC REHAB | Facility: HOSPITAL | Age: 66
End: 2021-11-03
Attending: INTERNAL MEDICINE
Payer: MEDICARE

## 2021-11-03 PROCEDURE — 93798 PHYS/QHP OP CAR RHAB W/ECG: CPT

## 2021-11-05 ENCOUNTER — CARDPULM VISIT (OUTPATIENT)
Dept: CARDIAC REHAB | Facility: HOSPITAL | Age: 66
End: 2021-11-05
Attending: INTERNAL MEDICINE
Payer: MEDICARE

## 2021-11-05 PROCEDURE — 93798 PHYS/QHP OP CAR RHAB W/ECG: CPT

## 2021-11-08 ENCOUNTER — CARDPULM VISIT (OUTPATIENT)
Dept: CARDIAC REHAB | Facility: HOSPITAL | Age: 66
End: 2021-11-08
Attending: INTERNAL MEDICINE
Payer: MEDICARE

## 2021-11-08 PROCEDURE — 93798 PHYS/QHP OP CAR RHAB W/ECG: CPT

## 2021-11-10 ENCOUNTER — APPOINTMENT (OUTPATIENT)
Dept: CARDIAC REHAB | Facility: HOSPITAL | Age: 66
End: 2021-11-10
Attending: INTERNAL MEDICINE
Payer: MEDICARE

## 2021-11-11 ENCOUNTER — OFFICE VISIT (OUTPATIENT)
Dept: FAMILY MEDICINE CLINIC | Facility: CLINIC | Age: 66
End: 2021-11-11
Payer: MEDICARE

## 2021-11-11 VITALS
RESPIRATION RATE: 20 BRPM | HEIGHT: 71 IN | BODY MASS INDEX: 39.9 KG/M2 | HEART RATE: 78 BPM | OXYGEN SATURATION: 100 % | SYSTOLIC BLOOD PRESSURE: 113 MMHG | DIASTOLIC BLOOD PRESSURE: 63 MMHG | WEIGHT: 285 LBS | TEMPERATURE: 98 F

## 2021-11-11 DIAGNOSIS — E11.65 UNCONTROLLED TYPE 2 DIABETES MELLITUS WITH HYPERGLYCEMIA, WITH LONG-TERM CURRENT USE OF INSULIN (HCC): ICD-10-CM

## 2021-11-11 DIAGNOSIS — Z79.4 UNCONTROLLED TYPE 2 DIABETES MELLITUS WITH HYPERGLYCEMIA, WITH LONG-TERM CURRENT USE OF INSULIN (HCC): ICD-10-CM

## 2021-11-11 DIAGNOSIS — L03.312 CELLULITIS OF BACK EXCEPT BUTTOCK: Primary | ICD-10-CM

## 2021-11-11 PROCEDURE — 99213 OFFICE O/P EST LOW 20 MIN: CPT | Performed by: NURSE PRACTITIONER

## 2021-11-11 RX ORDER — DOXYCYCLINE HYCLATE 100 MG/1
100 CAPSULE ORAL 2 TIMES DAILY
Qty: 14 CAPSULE | Refills: 0 | Status: SHIPPED | OUTPATIENT
Start: 2021-11-11 | End: 2021-11-12

## 2021-11-11 NOTE — PROGRESS NOTES
CHIEF COMPLAINT:   Patient presents with:  Skin Problem: 1 week sore on back,  redness, tender, patient wears life vest possible irritation from straps, itches  OTC neosporin with pain relief       HPI:     Diego Galarza is a 77year old male who present sacubitril-valsartan 49-51 MG Oral Tab Take 1 tablet by mouth 2 (two) times daily. 180 tablet 3   • spironolactone 25 MG Oral Tab Take 1 tablet (25 mg total) by mouth daily.  90 tablet 3   • Insulin Aspart Pen 100 UNIT/ML Subcutaneous Solution Pen-injector to OM and right PDA)   • Sarcoidosis    • Sciatica 12/20/2010   • Type 2 diabetes mellitus (HCC)     Dx at age 52   • Unspecified sleep apnea 7/9/2008    CPAP at home   • Vitamin D deficiency       Social History:  Social History    Tobacco Use      Smokin ASSESSMENT: Cellulitis of back except buttock  (primary encounter diagnosis)  Uncontrolled type 2 diabetes mellitus with hyperglycemia, with long-term current use of insulin (hcc)    PLAN: Skin care discussed with patient/wife.  Instructions and Comfort all the medicine can make future infections hard to treat. · Keep the infected area clean. · When possible, raise the infected area above the level of your heart. This helps keep swelling down. · Talk with your healthcare provider if you are in pain.  As

## 2021-11-11 NOTE — PROGRESS NOTES
CHIEF COMPLAINT:   Patient presents with:  Skin Problem: 1 week pimple like sore on back,  redness, tender, patient wears life vest possible irritation from strapes, itches  OTC neosporin with pain relief       HPI:     Sarath Rivera is a 77year old mal Insulin Aspart Pen 100 UNIT/ML Subcutaneous Solution Pen-injector Inject 4-20 Units into the skin TID CC and HS.  CORRECTION FACTOR - COLUMN HIGH DOSE  Continue to give correction insulin (Novolog/aspart) even if NPO; DO NOT HOLD OR ALTER INSULIN DOSE [facility] deficiency       Social History:  Social History    Tobacco Use      Smoking status: Former Smoker        Packs/day: 1.00        Years: 15.00        Pack years: 13        Start date: 1971        Quit date: 10/1/1987        Years since quittin.1 Care as listed in Patient Instructions. Medication as below. Risks, benefits, and side effects of medication explained and discussed. The patient is asked to f/u with PCP if no improvement in 3 days or sooner for new or worsening sx.     Requested Prescr

## 2021-11-12 ENCOUNTER — CARDPULM VISIT (OUTPATIENT)
Dept: CARDIAC REHAB | Facility: HOSPITAL | Age: 66
End: 2021-11-12
Attending: INTERNAL MEDICINE
Payer: MEDICARE

## 2021-11-12 PROCEDURE — 93798 PHYS/QHP OP CAR RHAB W/ECG: CPT

## 2021-11-12 NOTE — PATIENT INSTRUCTIONS
Wash area with soap and water; warm moist soaks to area four times daily  Complete entire course of antibiotics  Monitor closely for healing.     Go to ED or Immediate Care (discussed locations) if no improvement in 2-3 days or sooner for new or worsening draining from the area  · Fever of 100.4°F (38°C) or higher, or as directed by your healthcare provider  · Pain that gets worse in or around the infected   · Redness that gets worse in or around the infected area, particularly if the area of redness expand

## 2021-11-15 ENCOUNTER — CARDPULM VISIT (OUTPATIENT)
Dept: CARDIAC REHAB | Facility: HOSPITAL | Age: 66
End: 2021-11-15
Attending: INTERNAL MEDICINE
Payer: MEDICARE

## 2021-11-15 PROCEDURE — 93798 PHYS/QHP OP CAR RHAB W/ECG: CPT

## 2021-11-17 ENCOUNTER — CARDPULM VISIT (OUTPATIENT)
Dept: CARDIAC REHAB | Facility: HOSPITAL | Age: 66
End: 2021-11-17
Attending: INTERNAL MEDICINE
Payer: MEDICARE

## 2021-11-17 PROCEDURE — 93798 PHYS/QHP OP CAR RHAB W/ECG: CPT

## 2021-11-19 ENCOUNTER — CARDPULM VISIT (OUTPATIENT)
Dept: CARDIAC REHAB | Facility: HOSPITAL | Age: 66
End: 2021-11-19
Attending: INTERNAL MEDICINE
Payer: MEDICARE

## 2021-11-19 PROCEDURE — 93798 PHYS/QHP OP CAR RHAB W/ECG: CPT

## 2021-11-22 ENCOUNTER — CARDPULM VISIT (OUTPATIENT)
Dept: CARDIAC REHAB | Facility: HOSPITAL | Age: 66
End: 2021-11-22
Attending: INTERNAL MEDICINE
Payer: MEDICARE

## 2021-11-22 PROCEDURE — 93798 PHYS/QHP OP CAR RHAB W/ECG: CPT

## 2021-11-24 ENCOUNTER — CARDPULM VISIT (OUTPATIENT)
Dept: CARDIAC REHAB | Facility: HOSPITAL | Age: 66
End: 2021-11-24
Attending: INTERNAL MEDICINE
Payer: MEDICARE

## 2021-11-24 PROCEDURE — 93798 PHYS/QHP OP CAR RHAB W/ECG: CPT

## 2021-11-26 ENCOUNTER — CARDPULM VISIT (OUTPATIENT)
Dept: CARDIAC REHAB | Facility: HOSPITAL | Age: 66
End: 2021-11-26
Attending: INTERNAL MEDICINE
Payer: MEDICARE

## 2021-11-26 PROCEDURE — 93798 PHYS/QHP OP CAR RHAB W/ECG: CPT

## 2021-11-29 ENCOUNTER — CARDPULM VISIT (OUTPATIENT)
Dept: CARDIAC REHAB | Facility: HOSPITAL | Age: 66
End: 2021-11-29
Attending: INTERNAL MEDICINE
Payer: MEDICARE

## 2021-11-29 PROCEDURE — 93798 PHYS/QHP OP CAR RHAB W/ECG: CPT

## 2021-12-01 ENCOUNTER — CARDPULM VISIT (OUTPATIENT)
Dept: CARDIAC REHAB | Facility: HOSPITAL | Age: 66
End: 2021-12-01
Attending: INTERNAL MEDICINE
Payer: MEDICARE

## 2021-12-01 PROCEDURE — 93798 PHYS/QHP OP CAR RHAB W/ECG: CPT

## 2021-12-03 ENCOUNTER — CARDPULM VISIT (OUTPATIENT)
Dept: CARDIAC REHAB | Facility: HOSPITAL | Age: 66
End: 2021-12-03
Attending: INTERNAL MEDICINE
Payer: MEDICARE

## 2021-12-03 PROCEDURE — 93798 PHYS/QHP OP CAR RHAB W/ECG: CPT

## 2021-12-06 ENCOUNTER — CARDPULM VISIT (OUTPATIENT)
Dept: CARDIAC REHAB | Facility: HOSPITAL | Age: 66
End: 2021-12-06
Attending: INTERNAL MEDICINE
Payer: MEDICARE

## 2021-12-06 PROCEDURE — 93798 PHYS/QHP OP CAR RHAB W/ECG: CPT

## 2021-12-08 ENCOUNTER — CARDPULM VISIT (OUTPATIENT)
Dept: CARDIAC REHAB | Facility: HOSPITAL | Age: 66
End: 2021-12-08
Attending: INTERNAL MEDICINE
Payer: MEDICARE

## 2021-12-08 PROCEDURE — 93798 PHYS/QHP OP CAR RHAB W/ECG: CPT

## 2021-12-10 ENCOUNTER — CARDPULM VISIT (OUTPATIENT)
Dept: CARDIAC REHAB | Facility: HOSPITAL | Age: 66
End: 2021-12-10
Attending: INTERNAL MEDICINE
Payer: MEDICARE

## 2021-12-10 PROCEDURE — 93798 PHYS/QHP OP CAR RHAB W/ECG: CPT

## 2021-12-13 ENCOUNTER — CARDPULM VISIT (OUTPATIENT)
Dept: CARDIAC REHAB | Facility: HOSPITAL | Age: 66
End: 2021-12-13
Attending: INTERNAL MEDICINE
Payer: MEDICARE

## 2021-12-13 PROCEDURE — 93798 PHYS/QHP OP CAR RHAB W/ECG: CPT

## 2021-12-15 ENCOUNTER — CARDPULM VISIT (OUTPATIENT)
Dept: CARDIAC REHAB | Facility: HOSPITAL | Age: 66
End: 2021-12-15
Attending: INTERNAL MEDICINE
Payer: MEDICARE

## 2021-12-15 PROCEDURE — 93798 PHYS/QHP OP CAR RHAB W/ECG: CPT

## 2021-12-17 ENCOUNTER — CARDPULM VISIT (OUTPATIENT)
Dept: CARDIAC REHAB | Facility: HOSPITAL | Age: 66
End: 2021-12-17
Attending: INTERNAL MEDICINE
Payer: MEDICARE

## 2021-12-17 PROCEDURE — 93798 PHYS/QHP OP CAR RHAB W/ECG: CPT

## 2021-12-20 ENCOUNTER — CARDPULM VISIT (OUTPATIENT)
Dept: CARDIAC REHAB | Facility: HOSPITAL | Age: 66
End: 2021-12-20
Attending: INTERNAL MEDICINE
Payer: MEDICARE

## 2021-12-20 PROCEDURE — 93798 PHYS/QHP OP CAR RHAB W/ECG: CPT

## 2021-12-22 ENCOUNTER — CARDPULM VISIT (OUTPATIENT)
Dept: CARDIAC REHAB | Facility: HOSPITAL | Age: 66
End: 2021-12-22
Attending: INTERNAL MEDICINE
Payer: MEDICARE

## 2021-12-22 PROCEDURE — 93798 PHYS/QHP OP CAR RHAB W/ECG: CPT

## 2021-12-24 ENCOUNTER — APPOINTMENT (OUTPATIENT)
Dept: CARDIAC REHAB | Facility: HOSPITAL | Age: 66
End: 2021-12-24
Attending: INTERNAL MEDICINE
Payer: MEDICARE

## 2021-12-27 ENCOUNTER — CARDPULM VISIT (OUTPATIENT)
Dept: CARDIAC REHAB | Facility: HOSPITAL | Age: 66
End: 2021-12-27
Attending: INTERNAL MEDICINE
Payer: MEDICARE

## 2021-12-27 PROCEDURE — 93798 PHYS/QHP OP CAR RHAB W/ECG: CPT

## 2021-12-29 ENCOUNTER — APPOINTMENT (OUTPATIENT)
Dept: CARDIAC REHAB | Facility: HOSPITAL | Age: 66
End: 2021-12-29
Attending: INTERNAL MEDICINE
Payer: MEDICARE

## 2021-12-30 PROBLEM — T46.2X1A HYPOTHYROIDISM DUE TO AMIODARONE: Status: ACTIVE | Noted: 2021-12-30

## 2021-12-30 PROBLEM — E03.2 HYPOTHYROIDISM DUE TO AMIODARONE: Status: ACTIVE | Noted: 2021-12-30

## 2021-12-31 ENCOUNTER — APPOINTMENT (OUTPATIENT)
Dept: CARDIAC REHAB | Facility: HOSPITAL | Age: 66
End: 2021-12-31
Attending: INTERNAL MEDICINE
Payer: MEDICARE

## 2022-01-03 ENCOUNTER — APPOINTMENT (OUTPATIENT)
Dept: CARDIAC REHAB | Facility: HOSPITAL | Age: 67
End: 2022-01-03
Attending: INTERNAL MEDICINE
Payer: MEDICARE

## 2022-01-10 PROBLEM — E11.65 UNCONTROLLED TYPE 2 DIABETES MELLITUS WITH HYPERGLYCEMIA, WITH LONG-TERM CURRENT USE OF INSULIN (HCC): Status: RESOLVED | Noted: 2017-02-21 | Resolved: 2022-01-10

## 2022-01-10 PROBLEM — I63.9 ACUTE CVA (CEREBROVASCULAR ACCIDENT) (HCC): Status: RESOLVED | Noted: 2020-03-16 | Resolved: 2022-01-10

## 2022-01-10 PROBLEM — Z79.4 UNCONTROLLED TYPE 2 DIABETES MELLITUS WITH HYPERGLYCEMIA, WITH LONG-TERM CURRENT USE OF INSULIN (HCC): Status: RESOLVED | Noted: 2017-02-21 | Resolved: 2022-01-10

## 2022-01-10 PROBLEM — E66.9 OBESITY (BMI 30-39.9): Status: RESOLVED | Noted: 2021-08-30 | Resolved: 2022-01-10

## 2022-01-10 PROBLEM — I21.4 NSTEMI (NON-ST ELEVATED MYOCARDIAL INFARCTION) (HCC): Status: RESOLVED | Noted: 2021-08-04 | Resolved: 2022-01-10

## 2022-01-31 ENCOUNTER — CARDPULM VISIT (OUTPATIENT)
Dept: CARDIAC REHAB | Facility: HOSPITAL | Age: 67
End: 2022-01-31
Attending: INTERNAL MEDICINE
Payer: MEDICARE

## 2022-01-31 PROCEDURE — 93798 PHYS/QHP OP CAR RHAB W/ECG: CPT

## 2022-02-04 ENCOUNTER — CARDPULM VISIT (OUTPATIENT)
Dept: CARDIAC REHAB | Facility: HOSPITAL | Age: 67
End: 2022-02-04
Attending: INTERNAL MEDICINE
Payer: MEDICARE

## 2022-02-04 PROCEDURE — 93798 PHYS/QHP OP CAR RHAB W/ECG: CPT

## 2022-02-07 ENCOUNTER — CARDPULM VISIT (OUTPATIENT)
Dept: CARDIAC REHAB | Facility: HOSPITAL | Age: 67
End: 2022-02-07
Attending: INTERNAL MEDICINE
Payer: MEDICARE

## 2022-02-07 PROCEDURE — 93798 PHYS/QHP OP CAR RHAB W/ECG: CPT

## 2022-02-09 ENCOUNTER — CARDPULM VISIT (OUTPATIENT)
Dept: CARDIAC REHAB | Facility: HOSPITAL | Age: 67
End: 2022-02-09
Attending: INTERNAL MEDICINE
Payer: MEDICARE

## 2022-02-09 PROCEDURE — 93798 PHYS/QHP OP CAR RHAB W/ECG: CPT

## 2022-12-12 RX ORDER — CLOPIDOGREL BISULFATE 75 MG/1
75 TABLET ORAL DAILY
COMMUNITY
End: 2022-12-15

## 2022-12-12 RX ORDER — METOPROLOL TARTRATE 50 MG/1
50 TABLET, FILM COATED ORAL 2 TIMES DAILY
COMMUNITY

## 2022-12-12 RX ORDER — FUROSEMIDE 20 MG/1
20 TABLET ORAL 2 TIMES DAILY
COMMUNITY

## 2022-12-12 RX ORDER — ROSUVASTATIN CALCIUM 40 MG/1
40 TABLET, COATED ORAL NIGHTLY
COMMUNITY

## 2022-12-13 ENCOUNTER — LABORATORY ENCOUNTER (OUTPATIENT)
Dept: LAB | Age: 67
End: 2022-12-13
Attending: UROLOGY
Payer: MEDICARE

## 2022-12-13 ENCOUNTER — LAB ENCOUNTER (OUTPATIENT)
Dept: LAB | Age: 67
End: 2022-12-13
Attending: UROLOGY
Payer: MEDICARE

## 2022-12-13 DIAGNOSIS — Z01.818 PRE-OP TESTING: ICD-10-CM

## 2022-12-13 LAB
ANION GAP SERPL CALC-SCNC: 10 MMOL/L (ref 0–18)
BUN BLD-MCNC: 37 MG/DL (ref 7–18)
BUN/CREAT SERPL: 23.4 (ref 10–20)
CALCIUM BLD-MCNC: 9.5 MG/DL (ref 8.5–10.1)
CHLORIDE SERPL-SCNC: 103 MMOL/L (ref 98–112)
CO2 SERPL-SCNC: 26 MMOL/L (ref 21–32)
CREAT BLD-MCNC: 1.58 MG/DL
FASTING STATUS PATIENT QL REPORTED: YES
GFR SERPLBLD BASED ON 1.73 SQ M-ARVRAT: 48 ML/MIN/1.73M2 (ref 60–?)
GLUCOSE BLD-MCNC: 226 MG/DL (ref 70–99)
OSMOLALITY SERPL CALC.SUM OF ELEC: 304 MOSM/KG (ref 275–295)
POTASSIUM SERPL-SCNC: 4.3 MMOL/L (ref 3.5–5.1)
SARS-COV-2 RNA RESP QL NAA+PROBE: NOT DETECTED
SODIUM SERPL-SCNC: 139 MMOL/L (ref 136–145)

## 2022-12-13 PROCEDURE — 80048 BASIC METABOLIC PNL TOTAL CA: CPT

## 2022-12-15 ENCOUNTER — HOSPITAL ENCOUNTER (OUTPATIENT)
Facility: HOSPITAL | Age: 67
Setting detail: HOSPITAL OUTPATIENT SURGERY
Discharge: HOME | End: 2022-12-15
Attending: UROLOGY | Admitting: UROLOGY
Payer: MEDICARE

## 2022-12-15 ENCOUNTER — ANESTHESIA (OUTPATIENT)
Dept: SURGERY | Facility: HOSPITAL | Age: 67
End: 2022-12-15
Payer: MEDICARE

## 2022-12-15 ENCOUNTER — ANESTHESIA EVENT (OUTPATIENT)
Dept: SURGERY | Facility: HOSPITAL | Age: 67
End: 2022-12-15
Payer: MEDICARE

## 2022-12-15 VITALS
HEIGHT: 71 IN | TEMPERATURE: 98 F | RESPIRATION RATE: 16 BRPM | OXYGEN SATURATION: 100 % | SYSTOLIC BLOOD PRESSURE: 128 MMHG | WEIGHT: 305.38 LBS | DIASTOLIC BLOOD PRESSURE: 61 MMHG | BODY MASS INDEX: 42.75 KG/M2 | HEART RATE: 79 BPM

## 2022-12-15 DIAGNOSIS — Z01.818 PRE-OP TESTING: Primary | ICD-10-CM

## 2022-12-15 LAB
GLUCOSE BLD-MCNC: 133 MG/DL (ref 70–99)
GLUCOSE BLD-MCNC: 153 MG/DL (ref 70–99)

## 2022-12-15 PROCEDURE — 88307 TISSUE EXAM BY PATHOLOGIST: CPT | Performed by: UROLOGY

## 2022-12-15 PROCEDURE — 82962 GLUCOSE BLOOD TEST: CPT

## 2022-12-15 PROCEDURE — 0TBB8ZX EXCISION OF BLADDER, VIA NATURAL OR ARTIFICIAL OPENING ENDOSCOPIC, DIAGNOSTIC: ICD-10-PCS | Performed by: UROLOGY

## 2022-12-15 RX ORDER — SODIUM CHLORIDE, SODIUM LACTATE, POTASSIUM CHLORIDE, CALCIUM CHLORIDE 600; 310; 30; 20 MG/100ML; MG/100ML; MG/100ML; MG/100ML
INJECTION, SOLUTION INTRAVENOUS CONTINUOUS
Status: DISCONTINUED | OUTPATIENT
Start: 2022-12-15 | End: 2022-12-15

## 2022-12-15 RX ORDER — MIDAZOLAM HYDROCHLORIDE 1 MG/ML
1 INJECTION INTRAMUSCULAR; INTRAVENOUS EVERY 5 MIN PRN
Status: DISCONTINUED | OUTPATIENT
Start: 2022-12-15 | End: 2022-12-15

## 2022-12-15 RX ORDER — NICOTINE POLACRILEX 4 MG
30 LOZENGE BUCCAL
Status: DISCONTINUED | OUTPATIENT
Start: 2022-12-15 | End: 2022-12-15 | Stop reason: HOSPADM

## 2022-12-15 RX ORDER — NICOTINE POLACRILEX 4 MG
15 LOZENGE BUCCAL
Status: DISCONTINUED | OUTPATIENT
Start: 2022-12-15 | End: 2022-12-15 | Stop reason: HOSPADM

## 2022-12-15 RX ORDER — METOCLOPRAMIDE HYDROCHLORIDE 5 MG/ML
10 INJECTION INTRAMUSCULAR; INTRAVENOUS EVERY 8 HOURS PRN
Status: DISCONTINUED | OUTPATIENT
Start: 2022-12-15 | End: 2022-12-15

## 2022-12-15 RX ORDER — LIDOCAINE HYDROCHLORIDE 10 MG/ML
INJECTION, SOLUTION EPIDURAL; INFILTRATION; INTRACAUDAL; PERINEURAL AS NEEDED
Status: DISCONTINUED | OUTPATIENT
Start: 2022-12-15 | End: 2022-12-15 | Stop reason: SURG

## 2022-12-15 RX ORDER — LIDOCAINE HYDROCHLORIDE 20 MG/ML
JELLY TOPICAL AS NEEDED
Status: DISCONTINUED | OUTPATIENT
Start: 2022-12-15 | End: 2022-12-15 | Stop reason: HOSPADM

## 2022-12-15 RX ORDER — ACETAMINOPHEN 500 MG
1000 TABLET ORAL ONCE AS NEEDED
Status: DISCONTINUED | OUTPATIENT
Start: 2022-12-15 | End: 2022-12-15

## 2022-12-15 RX ORDER — HYDROCODONE BITARTRATE AND ACETAMINOPHEN 5; 325 MG/1; MG/1
2 TABLET ORAL ONCE AS NEEDED
Status: DISCONTINUED | OUTPATIENT
Start: 2022-12-15 | End: 2022-12-15

## 2022-12-15 RX ORDER — HYDROMORPHONE HYDROCHLORIDE 1 MG/ML
0.4 INJECTION, SOLUTION INTRAMUSCULAR; INTRAVENOUS; SUBCUTANEOUS EVERY 5 MIN PRN
Status: DISCONTINUED | OUTPATIENT
Start: 2022-12-15 | End: 2022-12-15

## 2022-12-15 RX ORDER — INSULIN ASPART 100 [IU]/ML
INJECTION, SOLUTION INTRAVENOUS; SUBCUTANEOUS ONCE
Status: DISCONTINUED | OUTPATIENT
Start: 2022-12-15 | End: 2022-12-15

## 2022-12-15 RX ORDER — ACETAMINOPHEN 500 MG
1000 TABLET ORAL ONCE
Status: DISCONTINUED | OUTPATIENT
Start: 2022-12-15 | End: 2022-12-15 | Stop reason: HOSPADM

## 2022-12-15 RX ORDER — HYDROMORPHONE HYDROCHLORIDE 1 MG/ML
0.2 INJECTION, SOLUTION INTRAMUSCULAR; INTRAVENOUS; SUBCUTANEOUS EVERY 5 MIN PRN
Status: DISCONTINUED | OUTPATIENT
Start: 2022-12-15 | End: 2022-12-15

## 2022-12-15 RX ORDER — ONDANSETRON 2 MG/ML
INJECTION INTRAMUSCULAR; INTRAVENOUS AS NEEDED
Status: DISCONTINUED | OUTPATIENT
Start: 2022-12-15 | End: 2022-12-15 | Stop reason: SURG

## 2022-12-15 RX ORDER — ONDANSETRON 2 MG/ML
4 INJECTION INTRAMUSCULAR; INTRAVENOUS EVERY 6 HOURS PRN
Status: DISCONTINUED | OUTPATIENT
Start: 2022-12-15 | End: 2022-12-15

## 2022-12-15 RX ORDER — MIDAZOLAM HYDROCHLORIDE 1 MG/ML
INJECTION INTRAMUSCULAR; INTRAVENOUS AS NEEDED
Status: DISCONTINUED | OUTPATIENT
Start: 2022-12-15 | End: 2022-12-15 | Stop reason: SURG

## 2022-12-15 RX ORDER — DEXAMETHASONE SODIUM PHOSPHATE 4 MG/ML
VIAL (ML) INJECTION AS NEEDED
Status: DISCONTINUED | OUTPATIENT
Start: 2022-12-15 | End: 2022-12-15 | Stop reason: SURG

## 2022-12-15 RX ORDER — DEXTROSE MONOHYDRATE 25 G/50ML
50 INJECTION, SOLUTION INTRAVENOUS
Status: DISCONTINUED | OUTPATIENT
Start: 2022-12-15 | End: 2022-12-15 | Stop reason: HOSPADM

## 2022-12-15 RX ORDER — ROCURONIUM BROMIDE 10 MG/ML
INJECTION, SOLUTION INTRAVENOUS AS NEEDED
Status: DISCONTINUED | OUTPATIENT
Start: 2022-12-15 | End: 2022-12-15 | Stop reason: SURG

## 2022-12-15 RX ORDER — NALOXONE HYDROCHLORIDE 0.4 MG/ML
80 INJECTION, SOLUTION INTRAMUSCULAR; INTRAVENOUS; SUBCUTANEOUS AS NEEDED
Status: DISCONTINUED | OUTPATIENT
Start: 2022-12-15 | End: 2022-12-15

## 2022-12-15 RX ORDER — CEFAZOLIN SODIUM/WATER 2 G/20 ML
2 SYRINGE (ML) INTRAVENOUS ONCE
Status: COMPLETED | OUTPATIENT
Start: 2022-12-15 | End: 2022-12-15

## 2022-12-15 RX ORDER — HYDROCODONE BITARTRATE AND ACETAMINOPHEN 5; 325 MG/1; MG/1
1 TABLET ORAL ONCE AS NEEDED
Status: DISCONTINUED | OUTPATIENT
Start: 2022-12-15 | End: 2022-12-15

## 2022-12-15 RX ORDER — HYDROMORPHONE HYDROCHLORIDE 1 MG/ML
0.6 INJECTION, SOLUTION INTRAMUSCULAR; INTRAVENOUS; SUBCUTANEOUS EVERY 5 MIN PRN
Status: DISCONTINUED | OUTPATIENT
Start: 2022-12-15 | End: 2022-12-15

## 2022-12-15 RX ADMIN — DEXAMETHASONE SODIUM PHOSPHATE 4 MG: 4 MG/ML VIAL (ML) INJECTION at 07:20:00

## 2022-12-15 RX ADMIN — ROCURONIUM BROMIDE 30 MG: 10 INJECTION, SOLUTION INTRAVENOUS at 07:30:00

## 2022-12-15 RX ADMIN — ROCURONIUM BROMIDE 10 MG: 10 INJECTION, SOLUTION INTRAVENOUS at 07:11:00

## 2022-12-15 RX ADMIN — MIDAZOLAM HYDROCHLORIDE 2 MG: 1 INJECTION INTRAMUSCULAR; INTRAVENOUS at 07:06:00

## 2022-12-15 RX ADMIN — SODIUM CHLORIDE, SODIUM LACTATE, POTASSIUM CHLORIDE, CALCIUM CHLORIDE: 600; 310; 30; 20 INJECTION, SOLUTION INTRAVENOUS at 07:06:00

## 2022-12-15 RX ADMIN — ONDANSETRON 4 MG: 2 INJECTION INTRAMUSCULAR; INTRAVENOUS at 07:39:00

## 2022-12-15 RX ADMIN — SODIUM CHLORIDE, SODIUM LACTATE, POTASSIUM CHLORIDE, CALCIUM CHLORIDE: 600; 310; 30; 20 INJECTION, SOLUTION INTRAVENOUS at 07:50:00

## 2022-12-15 RX ADMIN — CEFAZOLIN SODIUM/WATER 3 G: 2 G/20 ML SYRINGE (ML) INTRAVENOUS at 07:18:00

## 2022-12-15 RX ADMIN — LIDOCAINE HYDROCHLORIDE 50 MG: 10 INJECTION, SOLUTION EPIDURAL; INFILTRATION; INTRACAUDAL; PERINEURAL at 07:11:00

## 2022-12-15 RX ADMIN — ROCURONIUM BROMIDE 40 MG: 10 INJECTION, SOLUTION INTRAVENOUS at 07:15:00

## 2022-12-15 NOTE — ANESTHESIA PROCEDURE NOTES
Airway  Date/Time: 12/15/2022 7:12 AM  Urgency: elective    Airway not difficult    General Information and Staff    Patient location during procedure: OR  Anesthesiologist: Mallika Nguyen MD  Resident/CRNA: Raine Beckett CRNA  Performed: CRNA     Indications and Patient Condition  Indications for airway management: anesthesia  Sedation level: deep  Preoxygenated: yes  Patient position: sniffing  Mask difficulty assessment: 0 - not attempted    Final Airway Details  Final airway type: endotracheal airway      Successful airway: ETT  Cuffed: yes   Successful intubation technique: Video laryngoscopy  Facilitating devices/methods: rapid sequence intubation and intubating stylet  Endotracheal tube insertion site: oral  Blade: GlideScope  Blade size: #4  ETT size (mm): 7.5    Cormack-Lehane Classification: grade I - full view of glottis  Placement verified by: chest auscultation and capnometry   Measured from: lips  ETT to lips (cm): 22  Number of attempts at approach: 1    Additional Comments  Glide Scope #4 used for intubation, pre -oxygenation with RSI

## 2022-12-15 NOTE — DISCHARGE INSTRUCTIONS
Expect a small amount of blood in the urine. Expect dysuria, urinary frequency and urgency which will improve over the next few days. Drink plenty of water and avoid constipation. Call if fever, passing large blood clots, severe pain, questions or concerns. Please continue the 81 mg aspirin as prescribed.  You may resume the clopidogrel (plavix) tomorrow (Friday)

## 2022-12-15 NOTE — BRIEF OP NOTE
Pre-Operative Diagnosis: BLADDER TUMORS; HISTORY OF BLADDER CANCER     Post-Operative Diagnosis: BLADDER TUMORS; HISTORY OF BLADDER TUMOR      Procedure Performed:   CYSTOSCOPY, BLADDER BIOPSY WITH FULGURATION OF 8 MM and 5 MM BLADDER TUMORS    Surgeon(s) and Role:     * Tip Samaniego DO - Primary    Assistant(s):   None     Surgical Findings: 5 mm bladder tumor just medial to the left ureteral orifice on the trigone, 8 mm bladder tumor at the left lateral bladder wall     Specimen: Bladder tumors     Estimated Blood Loss: Blood Output: 5 mL (12/15/2022  7:39 AM)      Dictation Number:  [id number]    Jacoby Moon DO  12/15/2022  7:57 AM

## 2022-12-15 NOTE — H&P
H&P dated 11/28/22 by Dr. Rodrigue Albright was reviewed. He was also seen and cleared by his PCP Dr. Aravind Mckenna. He has no complaints this morning. Patient seen in preoperative area. We discussed the surgical plan for today and again discussed risks, benefits, alternatives, and post operative expectations. Patient verbalized understanding and all questions answered. He would like to proceed with surgery as planned.       [facility], [address] [facility] Urology

## 2022-12-16 NOTE — OPERATIVE REPORT
[facility]    PATIENT'S NAME: Anjel Temple   ATTENDING PHYSICIAN: Latasha Gomez DO   OPERATING PHYSICIAN: Latasha Gomez DO   PATIENT ACCOUNT#:   [de-identified]    LOCATION:  [address] [place] [address]  MEDICAL RECORD #:   [id number]       YOB: 1955  ADMISSION DATE:       12/15/2022      OPERATION DATE:  12/15/2022    OPERATIVE REPORT      PREOPERATIVE DIAGNOSIS:    1.   Bladder tumors. 2.   History of bladder cancer. POSTOPERATIVE DIAGNOSIS:    1.   Bladder tumors. 2.   History of bladder cancer. PROCEDURE:  Cystoscopy, bladder biopsy with fulguration of 8 mm and 5 mm bladder tumors. ANESTHESIA:  General.    ESTIMATED BLOOD LOSS:  5 mL. SPECIMENS:  Bladder tumors. COMPLICATIONS:  None. DRAINS:  None. 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 He subsequently had a bladder tumor recurrence, which was papillary urothelial neoplasm of low malignant potential on pathologic analysis. On routine bladder surveillance cystoscopy, he was found to have 2 papillary tumors, and after discussion of risks, benefits, and alternatives, he has elected to proceed with operative intervention. FINDINGS:  There was a 5 mm bladder tumor just medial to the left ureteral orifice on the trigone and an 8 mm bladder tumor at the left lateral bladder wall just lateral to the bladder neck. OPERATIVE TECHNIQUE:  After the appropriate informed consent was obtained and in the chart, the patient was given preoperative antibiotics, taken to the cystoscopy suite, placed on the cystoscopy table in supine position. After bilateral sequential compression devices had been applied and satisfactory general anesthesia had been achieved, the patient's legs were raised to a dorsal lithotomy position.   The patient's groin was prepped and draped in the usual sterile fashion. A well-lubricated 21-Syriac rigid cystoscope was then introduced through a normal anterior male urethra through a prostatic fossa demonstrative of mild lateral lobe hyperplasia and into the bladder. Upon entering the bladder, the bilateral ureteral orifices were seen in the normal expected anatomic position. The bladder was drained and refilled with sterile irrigant. Systematic examination of the bladder showed no signs of foreign body, no diverticula, no extrinsic pressure defect, and showed 1 to 2+ trabeculation. There were 2 papillary bladder tumors seen as above. Flexible biopsy graspers were then used to biopsy both tumors, and additional deeper samples were taken of the tumor at the left lateral bladder wall. Bugbee electrocautery was then used to cauterize the biopsy sites, and they were seen to be completely hemostatic with no evidence of any remaining abnormal mucosa. The biopsies were then sent to Pathology, appropriately labeled as specimen. The bladder was drained and refilled with sterile irrigant several times to ensure clearance of any tumor and debris. The biopsy sites were again reinspected under a low-flow, low-pressure state and were seen to be completely hemostatic. The left ureteral orifice was also reinspected, and it was approximately 1.5 cm lateral to the biopsy site on the trigone and was unharmed in the procedure. There was clear efflux of urine seen from the left ureteral orifice. The bladder was drained. The cystoscope was removed. Lidocaine 2% jelly was placed in the urethra, and the procedure was terminated. The patient tolerated the procedure well, was extubated in the operating room, and transferred to the postanesthesia care unit in stable condition with no immediately apparent complications. We will await the pathology results for further recommendations.      Dictated By Vince Alicea DO  d: 12/15/2022 08:01:30  t: 12/16/2022 14:15:33  Job [id number]  CWJ/

## (undated) DEVICE — LEAD TEMP PACING UNIPOLAR 6500

## (undated) DEVICE — SUTURE PROLENE 8-0 BV-130-5

## (undated) DEVICE — SYRINGE 30ML LL TIP

## (undated) DEVICE — SUTURE WIRE DOUBLE STERNOTOMY

## (undated) DEVICE — SOLUTION  .9 3000ML

## (undated) DEVICE — SUTURE SILK 0 FSL

## (undated) DEVICE — OPEN HEART: Brand: MEDLINE INDUSTRIES, INC.

## (undated) DEVICE — CELL SAVER BAG 600ML 4R2023

## (undated) DEVICE — SYSTEM ZDRIVE NLTX DISPOSABLE

## (undated) DEVICE — ABSORBABLE HEMOSTAT (OXIDIZED REGENERATED CELLULOSE, U.S.P.): Brand: SURGICEL

## (undated) DEVICE — SUTURE PROLENE 7-0 BV-1

## (undated) DEVICE — BLOWER CO2 MEDTRONIC/DLP 22150

## (undated) DEVICE — INDICATED FOR SURGICAL CLAMPING DURING CARDIOVASCULAR PERIPHERAL VASCULAR, AND GENERAL SURGERY.: Brand: SOFT/FIBRA® SPRING CLIP

## (undated) DEVICE — SLEEVE KENDALL SCD EXPRESS MED

## (undated) DEVICE — TRAY ENDOVEIN KTV16 HARVESTING

## (undated) DEVICE — SPONGE LAP 18X18IN 7IN LOOP

## (undated) DEVICE — TUR/ENDOSCOPIC CABLE, 10' (3.05 M): Brand: CONMED

## (undated) DEVICE — SUTURE SILK 2-0

## (undated) DEVICE — STERILE POLYISOPRENE POWDER-FREE SURGICAL GLOVES: Brand: PROTEXIS

## (undated) DEVICE — HEMOCLIP HORIZON LG 004200

## (undated) DEVICE — CYSTO CDS-LF: Brand: MEDLINE INDUSTRIES, INC.

## (undated) DEVICE — SOL  .9 1000ML BTL

## (undated) DEVICE — PDS II VLT 0 107CM AG ST3: Brand: ENDOLOOP

## (undated) DEVICE — SOL H2O 3000ML IRRIG

## (undated) DEVICE — SUTURE POLYDEK 2-0

## (undated) DEVICE — GOWN,SIRUS,FAB REINF,RAGLAN,XL,STERILE: Brand: MEDLINE

## (undated) DEVICE — SUTURE PROLENE 5-0 C-1

## (undated) DEVICE — SYRINGE,TOOMEY,IRRIGATION,70CC,STERILE: Brand: MEDLINE

## (undated) DEVICE — SOL LACT RINGERS 1000ML

## (undated) DEVICE — CELL SAVER RESERVOIR

## (undated) DEVICE — HEMOCLIP HORIZON SM MULTI

## (undated) DEVICE — MEGADYNE ELECTRODE ADULT PT RT

## (undated) NOTE — LETTER
Liu Evangelista M.D., F.A.C.S. Philippe Peña M.D., F.A.C.S. Roseanna Paez M.D., Mitchell Robert. JOSAFAT Shea M.D., F.A.C.S. Theresa Mak. Gay Escobar M.D., F.A.C.S. Julieta Morillo M.D. Lahoma Friedman, M. Catheryn Ganser A.D. Rosann Carolina, M.D., F. concerns answered. If there are any issues which have not been adequately addressed, we ask you to bring them forward so that we can thoroughly address them.     A patient who is fully informed and understands their condition and options for treatment, as w _____    A [facility] surgeon as explained to me that if I should so desire, he/she is willing to explain my case and the surgical and non-surgical options to family members:             Yes _____ No _____    A [facility] surgeon has answered all of my questions regardin

## (undated) NOTE — LETTER
[address] [facility] FOR BLOOD OR BLOOD COMPONENTS      In the course of your treatment, it may become necessary to administer a transfusion of blood or blood components.  This form provides basic information concerning this proc alternatives to you if it has not already been done. I,Andrew Maurer, have read/had read to me the above. I understand the matters bearing on the decision whether or not to authorize a transfusion of blood or blood components.  I have no questions which

## (undated) NOTE — LETTER
[address]me[address]  [address] [facility] [address], [address] [facility]  Authorization for Surgical Operation and Procedure     Date:___________                                                                                                         Time:__________ occur: fever and allergic reactions, hemolytic reactions, transmission of diseases such as Hepatitis, AIDS and Cytomegalovirus (CMV) and fluid overload.   In the event that I wish to have an autologous transfusion of my own blood, or a directed donor transf applicable recovery period ends for purposes of reinstating the DNAR order.   10. Patients having a sterilization procedure: I understand that if the procedure is successful the results will be permanent and it will therefore be impossible for me to insemin medicine and do additional procedures as necessary. Some examples are: Starting or using an “IV” to give me medicine, fluids or blood during my procedure, and having a breathing tube placed to help me breathe when I’m asleep (intubation).  In the event that but potential complications include headache, bleeding, infection, seizure, irregular heart rhythms, and nerve injury.     I can change my mind about having anesthesia services at any time before I get the medicine.    ______________________________________

## (undated) NOTE — LETTER
[facility] [address], [address]  Consent for Procedure/Sedation    Date: 8/5/2021    Time:       1.  I authorize the performance upon Diego Galarza the following:cardiac catheterization, left ventricular cineangiography, allyssa Signature of person authorized                                     Relationship to  to consent for patient: _________________________ patient: ___________________    Witness: _______________________________ Date: _____________________    Printed: 8/5/2021

## (undated) NOTE — LETTER
[facility] [address], [address] [place] [address]  Consent for Procedure/Sedation    Date: ***    Time: ***      {edw ivs consent:1856}